# Patient Record
Sex: FEMALE | Race: WHITE | NOT HISPANIC OR LATINO | Employment: PART TIME
[De-identification: names, ages, dates, MRNs, and addresses within clinical notes are randomized per-mention and may not be internally consistent; named-entity substitution may affect disease eponyms.]

---

## 2017-11-13 ENCOUNTER — CHARTING TRANS (OUTPATIENT)
Dept: OTHER | Age: 52
End: 2017-11-13

## 2017-11-13 ASSESSMENT — PAIN SCALES - GENERAL: PAINLEVEL_OUTOF10: 0

## 2017-11-28 ENCOUNTER — LAB SERVICES (OUTPATIENT)
Dept: OTHER | Age: 52
End: 2017-11-28

## 2017-11-28 ENCOUNTER — IMAGING SERVICES (OUTPATIENT)
Dept: OTHER | Age: 52
End: 2017-11-28

## 2017-11-28 ENCOUNTER — HOSPITAL (OUTPATIENT)
Dept: OTHER | Age: 52
End: 2017-11-28
Attending: EMERGENCY MEDICINE

## 2017-11-28 LAB
ANALYZER ANC (IANC): NORMAL
ANALYZER ANC (IANC): NORMAL
ANION GAP SERPL CALC-SCNC: 16 MMOL/L (ref 10–20)
ANION GAP SERPL CALC-SCNC: 16 MMOL/L (ref 10–20)
APPEARANCE UR: CLEAR
APPEARANCE UR: CLEAR
BASO+EOS+MONOS # BLD: 0.2 K/MCL (ref 0.1–1.1)
BASO+EOS+MONOS # BLD: 0.2 THOUSAND/MCL (ref 0.1–1.1)
BASO+EOS+MONOS NFR BLD: 5 %
BASO+EOS+MONOS NFR BLD: 5 %
BILIRUB UR QL STRIP: NEGATIVE
BILIRUB UR QL STRIP: NEGATIVE
BUN SERPL-MCNC: 8 MG/DL (ref 6–20)
BUN SERPL-MCNC: 8 MG/DL (ref 6–20)
BUN/CREAT SERPL: 10 (ref 7–25)
BUN/CREAT SERPL: 10 (ref 7–25)
CHLORIDE SERPL-SCNC: 101 MMOL/L (ref 98–107)
CHLORIDE: 101 MMOL/L (ref 98–107)
CO2 SERPL-SCNC: 26 MMOL/L (ref 21–32)
CO2 SERPL-SCNC: 26 MMOL/L (ref 21–32)
COLOR UR: YELLOW
COLOR UR: YELLOW
CREAT SERPL-MCNC: 0.8 MG/DL (ref 0.51–0.95)
CREAT SERPL-MCNC: 0.8 MG/DL (ref 0.51–0.95)
DIFFERENTIAL METHOD BLD: NORMAL
DIFFERENTIAL METHOD BLD: NORMAL
ERYTHROCYTE [DISTWIDTH] IN BLOOD: 12.9 % (ref 11–15)
ERYTHROCYTE [DISTWIDTH] IN BLOOD: 12.9 % (ref 11–15)
GLUCOSE SERPL-MCNC: 106 MG/DL (ref 65–99)
GLUCOSE UR STRIP-MCNC: NEGATIVE MG/DL
GLUCOSE UR STRIP-MCNC: NEGATIVE MG/DL
HCG POINT OF CARE (5HGRST): NEGATIVE
HCG POINT OF CARE (5HGRST): NEGATIVE
HEMATOCRIT: 41.4 % (ref 36–46.5)
HEMATOCRIT: 41.4 % (ref 36–46.5)
HEMOCCULT STL QL: ABNORMAL
HEMOCCULT STL QL: ABNORMAL
HEMOGLOBIN: 14.3 G/DL (ref 12–15.5)
HGB BLD-MCNC: 14.3 GM/DL (ref 12–15.5)
KETONES UR STRIP-MCNC: NEGATIVE MG/DL
KETONES UR STRIP-MCNC: NEGATIVE MG/DL
LEUKOCYTE ESTERASE UR QL STRIP: NEGATIVE
LEUKOCYTE ESTERASE UR QL STRIP: NEGATIVE
LYMPHOCYTES # BLD: 2.2 K/MCL (ref 1–4.8)
LYMPHOCYTES # BLD: 2.2 THOUSAND/MCL (ref 1–4.8)
LYMPHOCYTES NFR BLD: 41 %
LYMPHOCYTES NFR BLD: 41 %
MCH RBC QN AUTO: 32.4 PG (ref 26–34)
MCHC RBC AUTO-ENTMCNC: 34.5 GM/DL (ref 32–36.5)
MCV RBC AUTO: 93.9 FL (ref 78–100)
MEAN CORPUSCULAR HEMOGLOBIN: 32.4 PG (ref 26–34)
MEAN CORPUSCULAR HGB CONC: 34.5 G/DL (ref 32–36.5)
MEAN CORPUSCULAR VOLUME: 93.9 FL (ref 78–100)
NEUTROPHILS # BLD: 2.9 K/MCL (ref 1.8–7.7)
NEUTROPHILS # BLD: 2.9 THOUSAND/MCL (ref 1.8–7.7)
NEUTROPHILS NFR BLD: 54 %
NEUTROPHILS NFR BLD: 54 %
NEUTS SEG NFR BLD: NORMAL
NEUTS SEG NFR BLD: NORMAL %
NITRITE UR QL STRIP: NEGATIVE
NITRITE UR QL STRIP: NEGATIVE
PH UR STRIP: 7 UNIT (ref 5–7)
PH UR STRIP: 7 UNITS (ref 5–7)
PLATELET # BLD: 199 THOUSAND/MCL (ref 140–450)
PLATELET COUNT: 199 K/MCL (ref 140–450)
POTASSIUM SERPL-SCNC: 3.9 MMOL/L (ref 3.4–5.1)
POTASSIUM SERPL-SCNC: 3.9 MMOL/L (ref 3.4–5.1)
PROT UR STRIP-MCNC: NEGATIVE MG/DL
PROT UR STRIP-MCNC: NEGATIVE MG/DL
RBC # BLD: 4.41 MILLION/MCL (ref 4–5.2)
RED CELL COUNT: 4.41 MIL/MCL (ref 4–5.2)
SODIUM SERPL-SCNC: 139 MMOL/L (ref 135–145)
SODIUM SERPL-SCNC: 139 MMOL/L (ref 135–145)
SP GR UR STRIP: 1.01 (ref 1–1.03)
SP GR UR STRIP: 1.01 (ref 1–1.03)
UROBILINOGEN UR STRIP-MCNC: 0.2 MG/DL (ref 0–1)
UROBILINOGEN UR STRIP-MCNC: 0.2 MG/DL (ref 0–1)
WBC # BLD: 5.3 THOUSAND/MCL (ref 4.2–11)
WHITE BLOOD COUNT: 5.3 K/MCL (ref 4.2–11)

## 2017-11-29 LAB — GLUCOSE SERPL-MCNC: 106 MG/DL (ref 65–99)

## 2018-01-08 ENCOUNTER — IMAGING SERVICES (OUTPATIENT)
Dept: OTHER | Age: 53
End: 2018-01-08

## 2018-01-08 ENCOUNTER — HOSPITAL (OUTPATIENT)
Dept: OTHER | Age: 53
End: 2018-01-08
Attending: FAMILY MEDICINE

## 2018-01-08 ENCOUNTER — LAB SERVICES (OUTPATIENT)
Dept: OTHER | Age: 53
End: 2018-01-08

## 2018-01-11 ENCOUNTER — CHARTING TRANS (OUTPATIENT)
Dept: OTHER | Age: 53
End: 2018-01-11

## 2018-01-11 LAB
25(OH)D3+25(OH)D2 SERPL-MCNC: 40.8 NG/ML (ref 30–100)
ALBUMIN SERPL-MCNC: 4.1 G/DL (ref 3.6–5.1)
ALBUMIN/GLOB SERPL: 1.6 (ref 1–2.4)
ALP SERPL-CCNC: 47 UNITS/L (ref 45–117)
ALT SERPL-CCNC: 19 UNITS/L
ANION GAP SERPL CALC-SCNC: 11 MMOL/L (ref 10–20)
AST SERPL-CCNC: 23 UNITS/L
BASOPHILS # BLD: 0 K/MCL (ref 0–0.3)
BASOPHILS NFR BLD: 0 %
BILIRUB SERPL-MCNC: 1 MG/DL (ref 0.2–1)
BUN SERPL-MCNC: 13 MG/DL (ref 6–20)
BUN/CREAT SERPL: 16 (ref 7–25)
CALCIUM SERPL-MCNC: 8.6 MG/DL (ref 8.4–10.2)
CHLORIDE SERPL-SCNC: 100 MMOL/L (ref 98–107)
CHOLEST SERPL-MCNC: 199 MG/DL
CHOLEST/HDLC SERPL: 3.1
CO2 SERPL-SCNC: 30 MMOL/L (ref 21–32)
CREAT SERPL-MCNC: 0.82 MG/DL (ref 0.51–0.95)
DIFFERENTIAL METHOD BLD: ABNORMAL
EOSINOPHIL # BLD: 0 K/MCL (ref 0.1–0.5)
EOSINOPHIL NFR BLD: 1 %
ERYTHROCYTE [DISTWIDTH] IN BLOOD: 12.5 % (ref 11–15)
GLOBULIN SER-MCNC: 2.6 G/DL (ref 2–4)
GLUCOSE SERPL-MCNC: 91 MG/DL (ref 65–99)
HDLC SERPL-MCNC: 65 MG/DL
HEMATOCRIT: 40.8 % (ref 36–46.5)
HEMOGLOBIN: 13.4 G/DL (ref 12–15.5)
LDLC SERPL CALC-MCNC: 122 MG/DL
LENGTH OF FAST TIME PATIENT: 12 HRS
LENGTH OF FAST TIME PATIENT: 12 HRS
LYMPHOCYTES # BLD: 1.6 K/MCL (ref 1–4.8)
LYMPHOCYTES NFR BLD: 31 %
MEAN CORPUSCULAR HEMOGLOBIN: 32 PG (ref 26–34)
MEAN CORPUSCULAR HGB CONC: 32.8 G/DL (ref 32–36.5)
MEAN CORPUSCULAR VOLUME: 97.4 FL (ref 78–100)
MONOCYTES # BLD: 0.3 K/MCL (ref 0.3–0.9)
MONOCYTES NFR BLD: 7 %
NEUTROPHILS # BLD: 3.1 K/MCL (ref 1.8–7.7)
NEUTROPHILS NFR BLD: 61 %
NONHDLC SERPL-MCNC: 134 MG/DL
PLATELET COUNT: 223 K/MCL (ref 140–450)
POTASSIUM SERPL-SCNC: 3.9 MMOL/L (ref 3.4–5.1)
RED CELL COUNT: 4.19 MIL/MCL (ref 4–5.2)
SODIUM SERPL-SCNC: 137 MMOL/L (ref 135–145)
TOTAL PROTEIN: 6.7 G/DL (ref 6.4–8.2)
TRIGL SERPL-MCNC: 59 MG/DL
TSH SERPL-ACNC: 2.65 MCUNITS/ML (ref 0.35–5)
WHITE BLOOD COUNT: 5.1 K/MCL (ref 4.2–11)

## 2018-01-16 ENCOUNTER — PRIOR ORIGINAL RECORDS (OUTPATIENT)
Dept: OTHER | Age: 53
End: 2018-01-16

## 2018-01-16 ENCOUNTER — MYAURORA ACCOUNT LINK (OUTPATIENT)
Dept: OTHER | Age: 53
End: 2018-01-16

## 2018-01-25 ENCOUNTER — MYAURORA ACCOUNT LINK (OUTPATIENT)
Dept: OTHER | Age: 53
End: 2018-01-25

## 2018-01-29 ENCOUNTER — PRIOR ORIGINAL RECORDS (OUTPATIENT)
Dept: OTHER | Age: 53
End: 2018-01-29

## 2018-01-29 ENCOUNTER — HOSPITAL (OUTPATIENT)
Dept: OTHER | Age: 53
End: 2018-01-29
Attending: INTERNAL MEDICINE

## 2018-01-29 LAB — COLONOSCOPY STUDY: NORMAL

## 2018-02-05 ENCOUNTER — PRIOR ORIGINAL RECORDS (OUTPATIENT)
Dept: OTHER | Age: 53
End: 2018-02-05

## 2018-02-05 ENCOUNTER — MYAURORA ACCOUNT LINK (OUTPATIENT)
Dept: OTHER | Age: 53
End: 2018-02-05

## 2018-06-12 ENCOUNTER — HOSPITAL (OUTPATIENT)
Dept: OTHER | Age: 53
End: 2018-06-12
Attending: OBSTETRICS & GYNECOLOGY

## 2018-11-02 VITALS
BODY MASS INDEX: 24.64 KG/M2 | RESPIRATION RATE: 16 BRPM | HEIGHT: 67 IN | DIASTOLIC BLOOD PRESSURE: 70 MMHG | OXYGEN SATURATION: 98 % | TEMPERATURE: 97.4 F | HEART RATE: 64 BPM | WEIGHT: 157 LBS | SYSTOLIC BLOOD PRESSURE: 118 MMHG

## 2018-11-26 ENCOUNTER — MYAURORA ACCOUNT LINK (OUTPATIENT)
Dept: OTHER | Age: 53
End: 2018-11-26

## 2018-11-26 ENCOUNTER — CHARTING TRANS (OUTPATIENT)
Dept: OTHER | Age: 53
End: 2018-11-26

## 2018-11-26 ASSESSMENT — PAIN SCALES - GENERAL: PAINLEVEL_OUTOF10: 0

## 2018-12-04 ENCOUNTER — TELEPHONE (OUTPATIENT)
Dept: FAMILY MEDICINE | Age: 53
End: 2018-12-04

## 2019-01-14 VITALS
BODY MASS INDEX: 24.88 KG/M2 | DIASTOLIC BLOOD PRESSURE: 70 MMHG | HEIGHT: 67 IN | SYSTOLIC BLOOD PRESSURE: 122 MMHG | OXYGEN SATURATION: 98 % | WEIGHT: 158.5 LBS | RESPIRATION RATE: 16 BRPM

## 2019-02-28 VITALS
HEIGHT: 67 IN | WEIGHT: 158 LBS | DIASTOLIC BLOOD PRESSURE: 76 MMHG | HEART RATE: 60 BPM | SYSTOLIC BLOOD PRESSURE: 122 MMHG | BODY MASS INDEX: 24.8 KG/M2

## 2019-02-28 VITALS
WEIGHT: 159.5 LBS | DIASTOLIC BLOOD PRESSURE: 73 MMHG | BODY MASS INDEX: 25.03 KG/M2 | RESPIRATION RATE: 16 BRPM | SYSTOLIC BLOOD PRESSURE: 127 MMHG | HEIGHT: 67 IN | HEART RATE: 42 BPM

## 2019-06-14 ENCOUNTER — HOSPITAL (OUTPATIENT)
Dept: OTHER | Age: 54
End: 2019-06-14
Attending: OBSTETRICS & GYNECOLOGY

## 2019-06-28 ENCOUNTER — HOSPITAL (OUTPATIENT)
Dept: OTHER | Age: 54
End: 2019-06-28
Attending: FAMILY MEDICINE

## 2019-06-28 LAB
25(OH)D3+25(OH)D2 SERPL-MCNC: 39.2 NG/ML (ref 30–100)
25(OH)D3+25(OH)D2 SERPL-MCNC: NORMAL NG/ML
ALBUMIN SERPL-MCNC: 3.6 G/DL (ref 3.6–5.1)
ALBUMIN/GLOB SERPL: 0.9 {RATIO} (ref 1–2.4)
ALP SERPL-CCNC: 94 UNITS/L (ref 45–117)
ALT SERPL-CCNC: 22 UNITS/L
ANALYZER ANC (IANC): NORMAL
ANION GAP SERPL CALC-SCNC: 9 MMOL/L (ref 10–20)
AST SERPL-CCNC: 12 UNITS/L
BILIRUB SERPL-MCNC: 0.6 MG/DL (ref 0.2–1)
BUN SERPL-MCNC: 13 MG/DL (ref 6–20)
BUN/CREAT SERPL: 19 (ref 7–25)
CALCIUM SERPL-MCNC: 8.6 MG/DL (ref 8.4–10.2)
CHLORIDE SERPL-SCNC: 107 MMOL/L (ref 98–107)
CHOLEST SERPL-MCNC: 139 MG/DL
CHOLEST SERPL-MCNC: NORMAL MG/DL
CHOLEST/HDLC SERPL: 2.3 {RATIO}
CO2 SERPL-SCNC: 28 MMOL/L (ref 21–32)
CREAT SERPL-MCNC: 0.67 MG/DL (ref 0.51–0.95)
ERYTHROCYTE [DISTWIDTH] IN BLOOD: 12.8 % (ref 11–15)
GLOBULIN SER-MCNC: 3.9 G/DL (ref 2–4)
GLUCOSE SERPL-MCNC: 165 MG/DL (ref 65–99)
HBA1C MFR BLD: 10.0           24 %
HBA1C MFR BLD: 6.0            126 %
HBA1C MFR BLD: 6.5            14 %
HBA1C MFR BLD: 6.7 % (ref 4.5–5.6)
HBA1C MFR BLD: 7.0            154 %
HBA1C MFR BLD: 7.5            169 %
HBA1C MFR BLD: 8.0            183 %
HBA1C MFR BLD: 8.5            197 %
HBA1C MFR BLD: 9.0            212 %
HBA1C MFR BLD: 9.5            226 %
HBA1C MFR BLD: ABNORMAL %
HCT VFR BLD CALC: 40.4 % (ref 36–46.5)
HDLC SERPL-MCNC: 60 MG/DL
HDLC SERPL-MCNC: NORMAL MG/DL
HGB BLD-MCNC: 13.1 G/DL (ref 12–15.5)
LDLC SERPL CALC-MCNC: 67 MG/DL
LDLC SERPL CALC-MCNC: NORMAL MG/DL
LENGTH OF FAST TIME PATIENT: ABNORMAL HRS
LENGTH OF FAST TIME PATIENT: NORMAL HRS
MCH RBC QN AUTO: 29.2 PG (ref 26–34)
MCHC RBC AUTO-ENTMCNC: 32.4 G/DL (ref 32–36.5)
MCV RBC AUTO: 90.2 FL (ref 78–100)
NONHDLC SERPL-MCNC: 79 MG/DL
NONHDLC SERPL-MCNC: NORMAL MG/DL
NRBC (NRBCRE): 0 /100 WBC
PLATELET # BLD: 246 K/MCL (ref 140–450)
POTASSIUM SERPL-SCNC: 3.9 MMOL/L (ref 3.4–5.1)
PROT SERPL-MCNC: 7.5 G/DL (ref 6.4–8.2)
RBC # BLD: 4.48 MIL/MCL (ref 4–5.2)
SODIUM SERPL-SCNC: 140 MMOL/L (ref 135–145)
TRIGL SERPL-MCNC: 62 MG/DL
TRIGL SERPL-MCNC: NORMAL MG/DL
TSH SERPL-ACNC: 1.03 MCUNITS/ML (ref 0.35–5)
WBC # BLD: 5.8 K/MCL (ref 4.2–11)

## 2019-06-29 LAB
CREAT UR-MCNC: 93.9 MG/DL
MICROALBUMIN UR-MCNC: <0.5 MG/DL
MICROALBUMIN/CREAT UR: NORMAL MG/G

## 2019-07-02 ENCOUNTER — TELEPHONE (OUTPATIENT)
Dept: SCHEDULING | Age: 54
End: 2019-07-02

## 2019-07-02 DIAGNOSIS — Z20.7 SCABIES EXPOSURE: Primary | ICD-10-CM

## 2019-07-02 RX ORDER — PERMETHRIN 50 MG/G
CREAM TOPICAL ONCE
Qty: 60 G | Refills: 0 | Status: SHIPPED | OUTPATIENT
Start: 2019-07-02 | End: 2019-07-02

## 2020-01-22 ENCOUNTER — TELEPHONE (OUTPATIENT)
Dept: SCHEDULING | Age: 55
End: 2020-01-22

## 2020-03-10 ENCOUNTER — TELEPHONE (OUTPATIENT)
Dept: SCHEDULING | Age: 55
End: 2020-03-10

## 2020-03-10 ENCOUNTER — OFFICE VISIT (OUTPATIENT)
Dept: FAMILY MEDICINE | Age: 55
End: 2020-03-10

## 2020-03-10 VITALS
HEART RATE: 57 BPM | TEMPERATURE: 98 F | WEIGHT: 159 LBS | BODY MASS INDEX: 24.1 KG/M2 | DIASTOLIC BLOOD PRESSURE: 70 MMHG | OXYGEN SATURATION: 98 % | SYSTOLIC BLOOD PRESSURE: 102 MMHG | HEIGHT: 68 IN | RESPIRATION RATE: 16 BRPM

## 2020-03-10 DIAGNOSIS — J03.90 TONSILLITIS: Primary | ICD-10-CM

## 2020-03-10 DIAGNOSIS — Z12.31 VISIT FOR SCREENING MAMMOGRAM: ICD-10-CM

## 2020-03-10 DIAGNOSIS — R92.30 DENSE BREAST TISSUE ON MAMMOGRAM: ICD-10-CM

## 2020-03-10 PROBLEM — R94.39 ABNORMAL STRESS TEST: Status: ACTIVE | Noted: 2018-01-12

## 2020-03-10 PROBLEM — R94.39 ABNORMAL STRESS TEST: Status: RESOLVED | Noted: 2018-01-12 | Resolved: 2020-03-10

## 2020-03-10 PROCEDURE — 99214 OFFICE O/P EST MOD 30 MIN: CPT | Performed by: PHYSICIAN ASSISTANT

## 2020-03-10 RX ORDER — AMOXICILLIN 875 MG/1
875 TABLET, COATED ORAL 2 TIMES DAILY
Qty: 20 TABLET | Refills: 0 | Status: SHIPPED | OUTPATIENT
Start: 2020-03-10 | End: 2020-11-23 | Stop reason: ALTCHOICE

## 2020-03-10 RX ORDER — RIZATRIPTAN BENZOATE 10 MG/1
10 TABLET ORAL PRN
COMMUNITY
Start: 2018-01-16 | End: 2020-09-04

## 2020-03-10 ASSESSMENT — ENCOUNTER SYMPTOMS
SORE THROAT: 1
COUGH: 0
FEVER: 0

## 2020-03-10 ASSESSMENT — PATIENT HEALTH QUESTIONNAIRE - PHQ9
2. FEELING DOWN, DEPRESSED OR HOPELESS: NOT AT ALL
1. LITTLE INTEREST OR PLEASURE IN DOING THINGS: NOT AT ALL
SUM OF ALL RESPONSES TO PHQ9 QUESTIONS 1 AND 2: 0
SUM OF ALL RESPONSES TO PHQ9 QUESTIONS 1 AND 2: 0

## 2020-04-06 ENCOUNTER — APPOINTMENT (OUTPATIENT)
Dept: MAMMOGRAPHY | Age: 55
End: 2020-04-06
Attending: PHYSICIAN ASSISTANT

## 2020-04-06 ENCOUNTER — APPOINTMENT (OUTPATIENT)
Dept: ULTRASOUND IMAGING | Age: 55
End: 2020-04-06
Attending: PHYSICIAN ASSISTANT

## 2020-04-14 ENCOUNTER — TELEPHONE (OUTPATIENT)
Dept: SCHEDULING | Age: 55
End: 2020-04-14

## 2020-04-14 DIAGNOSIS — R92.30 DENSE BREAST TISSUE ON MAMMOGRAM: Primary | ICD-10-CM

## 2020-04-14 DIAGNOSIS — Z12.31 VISIT FOR SCREENING MAMMOGRAM: ICD-10-CM

## 2020-05-20 DIAGNOSIS — Z12.39 SCREENING BREAST EXAMINATION: Primary | ICD-10-CM

## 2020-06-15 ENCOUNTER — APPOINTMENT (OUTPATIENT)
Dept: ULTRASOUND IMAGING | Age: 55
End: 2020-06-15
Attending: PHYSICIAN ASSISTANT

## 2020-06-15 ENCOUNTER — APPOINTMENT (OUTPATIENT)
Dept: MAMMOGRAPHY | Age: 55
End: 2020-06-15
Attending: PHYSICIAN ASSISTANT

## 2020-06-15 ENCOUNTER — HOSPITAL ENCOUNTER (OUTPATIENT)
Dept: MAMMOGRAPHY | Age: 55
Discharge: HOME | End: 2020-06-15
Attending: OBSTETRICS & GYNECOLOGY

## 2020-06-15 DIAGNOSIS — Z12.39 SCREENING BREAST EXAMINATION: ICD-10-CM

## 2020-06-15 PROCEDURE — 77063 BREAST TOMOSYNTHESIS BI: CPT

## 2020-09-04 RX ORDER — RIZATRIPTAN BENZOATE 10 MG/1
TABLET ORAL
Qty: 9 TABLET | Refills: 5 | Status: SHIPPED | OUTPATIENT
Start: 2020-09-04 | End: 2023-07-17 | Stop reason: SDUPTHER

## 2020-11-20 ENCOUNTER — TELEPHONE (OUTPATIENT)
Dept: FAMILY MEDICINE | Age: 55
End: 2020-11-20

## 2020-11-23 ENCOUNTER — OFFICE VISIT (OUTPATIENT)
Dept: FAMILY MEDICINE | Age: 55
End: 2020-11-23

## 2020-11-23 DIAGNOSIS — Z01.419 WELL FEMALE EXAM WITH ROUTINE GYNECOLOGICAL EXAM: Primary | ICD-10-CM

## 2020-11-23 DIAGNOSIS — Z13.220 SCREENING, LIPID: ICD-10-CM

## 2020-11-23 DIAGNOSIS — Z12.39 SCREENING BREAST EXAMINATION: ICD-10-CM

## 2020-11-23 DIAGNOSIS — Z11.51 SCREENING FOR HUMAN PAPILLOMAVIRUS (HPV): ICD-10-CM

## 2020-11-23 DIAGNOSIS — Z13.1 SCREENING FOR DIABETES MELLITUS: ICD-10-CM

## 2020-11-23 PROCEDURE — 99396 PREV VISIT EST AGE 40-64: CPT | Performed by: PHYSICIAN ASSISTANT

## 2020-11-23 ASSESSMENT — PAIN SCALES - GENERAL: PAINLEVEL: 0

## 2020-11-23 ASSESSMENT — ENCOUNTER SYMPTOMS
FEVER: 0
COUGH: 0
DIZZINESS: 0
ABDOMINAL PAIN: 0
SHORTNESS OF BREATH: 0

## 2020-11-30 LAB — HPV16+18+45 E6+E7MRNA CVX NAA+PROBE: NORMAL

## 2020-12-14 ENCOUNTER — HOSPITAL ENCOUNTER (OUTPATIENT)
Dept: ULTRASOUND IMAGING | Age: 55
Discharge: HOME | End: 2020-12-14
Attending: PHYSICIAN ASSISTANT

## 2020-12-14 ENCOUNTER — TELEPHONE (OUTPATIENT)
Dept: SCHEDULING | Age: 55
End: 2020-12-14

## 2020-12-14 ENCOUNTER — HOSPITAL ENCOUNTER (OUTPATIENT)
Dept: MAMMOGRAPHY | Age: 55
Discharge: HOME | End: 2020-12-14
Attending: PHYSICIAN ASSISTANT

## 2020-12-14 DIAGNOSIS — R92.30 DENSE BREASTS: ICD-10-CM

## 2020-12-14 DIAGNOSIS — R92.30 DENSE BREAST TISSUE ON MAMMOGRAM: Primary | ICD-10-CM

## 2020-12-14 DIAGNOSIS — Z12.31 ENCOUNTER FOR SCREENING MAMMOGRAM FOR MALIGNANT NEOPLASM OF BREAST: ICD-10-CM

## 2020-12-14 DIAGNOSIS — R92.2 DENSE BREASTS: ICD-10-CM

## 2020-12-14 PROCEDURE — 76641 ULTRASOUND BREAST COMPLETE: CPT

## 2020-12-14 PROCEDURE — 77063 BREAST TOMOSYNTHESIS BI: CPT

## 2020-12-17 ENCOUNTER — IMMUNIZATION (OUTPATIENT)
Dept: LAB | Facility: HOSPITAL | Age: 55
End: 2020-12-17
Attending: PREVENTIVE MEDICINE

## 2020-12-17 DIAGNOSIS — Z23 NEED FOR VACCINATION: ICD-10-CM

## 2020-12-17 PROCEDURE — 0001A PFIZER-BIONTECH COVID-19 VACCINE: CPT

## 2021-01-07 ENCOUNTER — IMMUNIZATION (OUTPATIENT)
Dept: LAB | Facility: HOSPITAL | Age: 56
End: 2021-01-07
Attending: PREVENTIVE MEDICINE

## 2021-01-07 DIAGNOSIS — Z23 NEED FOR VACCINATION: ICD-10-CM

## 2021-01-07 PROCEDURE — 0002A SARSCOV2 VAC 30MCG/0.3ML IM: CPT

## 2021-01-22 ENCOUNTER — TELEPHONE (OUTPATIENT)
Dept: FAMILY MEDICINE | Age: 56
End: 2021-01-22

## 2021-04-26 ENCOUNTER — TELEPHONE (OUTPATIENT)
Dept: PHYSICAL THERAPY | Facility: HOSPITAL | Age: 56
End: 2021-04-26

## 2021-04-26 ENCOUNTER — OFFICE VISIT (OUTPATIENT)
Dept: PHYSICAL THERAPY | Facility: HOSPITAL | Age: 56
End: 2021-04-26
Attending: EMERGENCY MEDICINE
Payer: OTHER MISCELLANEOUS

## 2021-04-26 ENCOUNTER — APPOINTMENT (OUTPATIENT)
Dept: OTHER | Facility: HOSPITAL | Age: 56
End: 2021-04-26
Attending: EMERGENCY MEDICINE

## 2021-04-26 ENCOUNTER — ORDER TRANSCRIPTION (OUTPATIENT)
Dept: PHYSICAL THERAPY | Facility: HOSPITAL | Age: 56
End: 2021-04-26

## 2021-04-26 DIAGNOSIS — M54.2 NECK PAIN: ICD-10-CM

## 2021-04-26 DIAGNOSIS — S46.912A STRAIN OF LEFT SHOULDER, INITIAL ENCOUNTER: ICD-10-CM

## 2021-04-26 DIAGNOSIS — R52 PAIN: Primary | ICD-10-CM

## 2021-04-26 DIAGNOSIS — S46.912A LEFT SHOULDER STRAIN: Primary | ICD-10-CM

## 2021-04-26 PROCEDURE — 97162 PT EVAL MOD COMPLEX 30 MIN: CPT

## 2021-04-26 PROCEDURE — 97530 THERAPEUTIC ACTIVITIES: CPT

## 2021-04-26 PROCEDURE — 97140 MANUAL THERAPY 1/> REGIONS: CPT

## 2021-04-26 NOTE — PROGRESS NOTES
SHOULDER EVALUATION:   Referring Physician: Dr. Lamonte Fink  Diagnosis: L shoulder pain (RTC strain) and L cervicalgia     Date of Service: 4/26/2021     PATIENT SUMMARY   Gail Arteaga is a 50year old female who presents to therapy today with complaints of L s shoulder pain and L cervicalgia.  The results of the objective tests and measures show full passive L shoulder ROM but painful arc and impaired active flexion/abduction, decreased strength L shoulder (especially flex/abd/ER),    Functional deficits include Sharath - +                       Randa - +                                          Esther - +                                  Cross over - +                                   Empty Can - +   ACJ                                      A-P  Shear x x expectations, posture, pain science 10' •  •  •  •  •    Manual Therapy • L DN infraspinatus with stim 5'  • L posterior delt with stim 5'  • L posterior GHJ Gr 3 5' •  •  •  •  •    Neuromuscular Re-education •  •  •  •  •  •    Subjective Comparable sign Potential:excellent    Patient/Family/Caregiver was advised of these findings, precautions, and treatment options and has agreed to actively participate in planning and for this course of care.     Thank you for your referral. Please co-sign or sign and ret

## 2021-04-28 ENCOUNTER — OFFICE VISIT (OUTPATIENT)
Dept: PHYSICAL THERAPY | Facility: HOSPITAL | Age: 56
End: 2021-04-28
Attending: EMERGENCY MEDICINE
Payer: OTHER MISCELLANEOUS

## 2021-04-28 PROCEDURE — 97140 MANUAL THERAPY 1/> REGIONS: CPT

## 2021-04-28 PROCEDURE — 97530 THERAPEUTIC ACTIVITIES: CPT

## 2021-04-28 PROCEDURE — 97112 NEUROMUSCULAR REEDUCATION: CPT

## 2021-04-28 NOTE — PROGRESS NOTES
PHYSICAL THERAPY DAILY TREATMENT NOTE  Sylvester Vidal  50year old  female  Diagnosis:  L shoulder pain (RTC strain) and L cervicalgia      Insurance (Authorized # of Visits):      N/A  Authorizing Physician: Dr. Cruz ref.  provider found  Next MD visit: none s 2 fingers  Extension: 60** local neck pain (L Upper C quadrant +)  Sidebending: R x; L x  Rotation: R 75; L 57* •  •  •  •  •    NV=next visit, AMRAP= as many reps as possible, RPE= rate of perceived exertion, LLLD= low load long duration, MIREILLE= repeated e

## 2021-05-05 ENCOUNTER — OFFICE VISIT (OUTPATIENT)
Dept: PHYSICAL THERAPY | Facility: HOSPITAL | Age: 56
End: 2021-05-05
Attending: EMERGENCY MEDICINE
Payer: OTHER MISCELLANEOUS

## 2021-05-05 PROCEDURE — 97140 MANUAL THERAPY 1/> REGIONS: CPT

## 2021-05-05 PROCEDURE — 97110 THERAPEUTIC EXERCISES: CPT

## 2021-05-05 NOTE — PROGRESS NOTES
PHYSICAL THERAPY DAILY TREATMENT NOTE  Mendy Sneed  50year old  female  Diagnosis:  L shoulder pain (RTC strain) and L cervicalgia      Insurance (Authorized # of Visits):      N/A  Authorizing Physician: Dr. Columba Baker  Next MD visit: none scheduled  Fall Ri gardening, belt is tough, bra, lifting, pulling, pushing, reaching, shoveling, pushing up •  •  •  •  •    Objective comparable signs • All impinge tests L +, +michelle/reloc  • Post cap decr - MT  • Strength decr MT, LT, ER, flex, abd  Cervical AROM Screen: Jamilah Marroquin improve shoulder ER strength throughout to 5/5 to improve function with lifting overhead   · Pt will demonstrate increased mid/low trap strength to 5/5 to promote improved shoulder mechanics and stabilization with lifting and reaching   Charges: 1TE, 3MT

## 2021-05-10 ENCOUNTER — APPOINTMENT (OUTPATIENT)
Dept: OTHER | Facility: HOSPITAL | Age: 56
End: 2021-05-10
Attending: EMERGENCY MEDICINE

## 2021-05-12 ENCOUNTER — OFFICE VISIT (OUTPATIENT)
Dept: PHYSICAL THERAPY | Facility: HOSPITAL | Age: 56
End: 2021-05-12
Attending: EMERGENCY MEDICINE
Payer: OTHER MISCELLANEOUS

## 2021-05-12 PROCEDURE — 97112 NEUROMUSCULAR REEDUCATION: CPT

## 2021-05-12 PROCEDURE — 97140 MANUAL THERAPY 1/> REGIONS: CPT

## 2021-05-12 NOTE — PROGRESS NOTES
PHYSICAL THERAPY DAILY TREATMENT NOTE  Arleen Esteban  50year old  female  Diagnosis:  L shoulder pain (RTC strain) and L cervicalgia      Insurance (Authorized # of Visits):      N/A  Authorizing Physician: Dr. Cruz ref.  provider found  Next MD visit: none s infraspinatus with stim 5'  • L ant delt with stim 5'  • L posterior GHJ Gr 3 8' • L DN infraspinatus with stim 5'  • L post delt with stim 5'  • L posterior GHJ Gr 3 8'  • Seated CTJ Gr5x1, supine upper thoracic Gr5x1  • R C2-3 Gr 3, Gr 5  • 40' • 1st rib needling, BFR= blood flow restriction, LOP=limb occlusion pression, rEMOM=rehab every minute on the minute     L Shoulder  • Pt describes symptom level current 2/10, at best 0/10, at worst 5/10.   o Aggravating factors:reaching behind back, reaching overhe

## 2021-05-17 ENCOUNTER — OFFICE VISIT (OUTPATIENT)
Dept: PHYSICAL THERAPY | Facility: HOSPITAL | Age: 56
End: 2021-05-17
Payer: OTHER MISCELLANEOUS

## 2021-05-17 PROCEDURE — 97140 MANUAL THERAPY 1/> REGIONS: CPT

## 2021-05-17 PROCEDURE — 97112 NEUROMUSCULAR REEDUCATION: CPT

## 2021-05-17 PROCEDURE — 97110 THERAPEUTIC EXERCISES: CPT

## 2021-05-17 NOTE — PROGRESS NOTES
PHYSICAL THERAPY DAILY TREATMENT NOTE  Ana Lilia Castillo  50year old  female  Diagnosis:  L shoulder pain (RTC strain) and L cervicalgia      Insurance (Authorized # of Visits):      N/A  Authorizing Physician: Dr. Cruz ref.  provider found  Next MD visit: none s Scapular   Flexion: R 5/5; L 3+/5*  5-/5 (2/10)  Abduction: R 5/5; L 3+/5* 5/5 painfree  ER: R 5/5; L 4/5* 5/5  IR: R 5/5; L 5/5  [email or web address]: R x/5; L x/5 5/5  [email or web address]: R x/5; L x/5 5/5 Rhomboids: R5/5, L 5/5  Mid trap: R 5/5; L 3/5 4/5  Lats: R 5/5, L 5/5  Low t delt with stim 5'  • L posterior GHJ Gr 3 8'  • Seated CTJ Gr5x1, supine upper thoracic Gr5x1  • R C2-3 Gr 3, Gr 5  • 40' • 1st rib L inferior mob Gr 3  • STJ mobs all directions, upward rotation/retraction  • Post cap stretch  • L posterior GHJ Gr 3 8'  • pain.  Improving scratch IR with MT to symmetrical, still relief with posterior glide support.   Improving CROM rotation with cervical mobilization •  •    NV=next visit, AMRAP= as many reps as possible, RPE= rate of perceived exertion, LLLD= low load long

## 2021-05-24 ENCOUNTER — OFFICE VISIT (OUTPATIENT)
Dept: OTHER | Facility: HOSPITAL | Age: 56
End: 2021-05-24
Attending: EMERGENCY MEDICINE

## 2021-05-24 DIAGNOSIS — R52 PAIN: Primary | ICD-10-CM

## 2021-05-26 VITALS
TEMPERATURE: 96.5 F | OXYGEN SATURATION: 99 % | RESPIRATION RATE: 16 BRPM | DIASTOLIC BLOOD PRESSURE: 64 MMHG | SYSTOLIC BLOOD PRESSURE: 110 MMHG | BODY MASS INDEX: 24.52 KG/M2 | HEIGHT: 67 IN | HEART RATE: 58 BPM | WEIGHT: 156.2 LBS

## 2021-06-02 ENCOUNTER — APPOINTMENT (OUTPATIENT)
Dept: PHYSICAL THERAPY | Facility: HOSPITAL | Age: 56
End: 2021-06-02
Payer: OTHER MISCELLANEOUS

## 2021-06-04 ENCOUNTER — OFFICE VISIT (OUTPATIENT)
Dept: PHYSICAL THERAPY | Facility: HOSPITAL | Age: 56
End: 2021-06-04
Payer: OTHER MISCELLANEOUS

## 2021-06-04 PROCEDURE — 97140 MANUAL THERAPY 1/> REGIONS: CPT

## 2021-06-04 PROCEDURE — 97110 THERAPEUTIC EXERCISES: CPT

## 2021-06-04 NOTE — PROGRESS NOTES
ProgressSummary  Pt has attended 6 visits in Physical Therapy.       PHYSICAL THERAPY DAILY TREATMENT NOTE  Kenan Castillo  50year old  female  Diagnosis:  L shoulder pain (RTC strain) and L cervicalgia      Insurance (Authorized # of Visits):      N/A  Au wfl but painful and painful arc   Scratch IR/ER now symmetrical but painful end range     PROM: (* denotes performed with pain)  Shoulder    All wfl symmetrical     Strength/MMT: (* denotes performed with pain)  Shoulder Scapular   Flexion: R 5/5; L 3+/5* expectations, posture, pain science 5' •  •  •  •    Manual Therapy • L DN infraspinatus with stim 5'  • L posterior delt with stim 5'  • L posterior GHJ Gr 3 5' • L DN infraspinatus with stim 5'  • L ant delt with stim 5'  • L posterior GHJ Gr 3 8' • L DN deltoid 0/10 pain. Improving scratch IR with MT to symmetrical, still relief with posterior glide support. Improving CROM rotation with cervical mobilization   .   Working on L cervical rotation, 1st rib mobility, etc.  Flexion improving 4+/5 to 5-/5 with function with lifting overhead MET  · Pt will improve shoulder ER strength throughout to 5/5 to improve function with lifting overhead MET  · Pt will demonstrate increased mid/low trap strength to 5/5 to promote improved shoulder mechanics and stabilizatio

## 2021-06-15 ENCOUNTER — OFFICE VISIT (OUTPATIENT)
Dept: PHYSICAL THERAPY | Facility: HOSPITAL | Age: 56
End: 2021-06-15
Payer: OTHER MISCELLANEOUS

## 2021-06-15 PROCEDURE — 97110 THERAPEUTIC EXERCISES: CPT

## 2021-06-15 PROCEDURE — 97530 THERAPEUTIC ACTIVITIES: CPT

## 2021-06-15 PROCEDURE — 97140 MANUAL THERAPY 1/> REGIONS: CPT

## 2021-06-15 NOTE — PROGRESS NOTES
PHYSICAL THERAPY DAILY TREATMENT NOTE  Mendy Sneed  50year old  female  Diagnosis:  L shoulder pain (RTC strain) and L cervicalgia      Insurance (Authorized # of Visits):      N/A  Authorizing Physician: Dr. Columba Baker  Next MD visit: none scheduled  Fall Ri Gr 3  • STJ mobs all directions, upward rotation/retraction  • Post cap stretch  • L posterior GHJ Gr 3 8'  • C2, C3 upslope R Gr 5  • 30'  •  • L DN infraspinatus with stim 5'  • L posterior GHJ Gr 3 8'   • L DN infraspinatus x2 and L post delt x2 with st MT to symmetrical, still relief with posterior glide support. Improving CROM rotation with cervical mobilization   .   Working on L cervical rotation, 1st rib mobility, etc.  Flexion improving 4+/5 to 5-/5 with cervicothoracic manual. Scratch IR still tend lifting overhead MET  · Pt will improve shoulder ER strength throughout to 5/5 to improve function with lifting overhead MET  · Pt will demonstrate increased mid/low trap strength to 5/5 to promote improved shoulder mechanics and stabilization with lifting

## 2021-06-16 ENCOUNTER — APPOINTMENT (OUTPATIENT)
Dept: PHYSICAL THERAPY | Facility: HOSPITAL | Age: 56
End: 2021-06-16
Payer: OTHER MISCELLANEOUS

## 2021-07-06 ENCOUNTER — APPOINTMENT (OUTPATIENT)
Dept: PHYSICAL THERAPY | Facility: HOSPITAL | Age: 56
End: 2021-07-06
Attending: EMERGENCY MEDICINE
Payer: OTHER MISCELLANEOUS

## 2021-07-06 ENCOUNTER — TELEPHONE (OUTPATIENT)
Dept: PHYSICAL THERAPY | Facility: HOSPITAL | Age: 56
End: 2021-07-06

## 2021-07-28 ENCOUNTER — OFFICE VISIT (OUTPATIENT)
Dept: PHYSICAL THERAPY | Facility: HOSPITAL | Age: 56
End: 2021-07-28
Attending: EMERGENCY MEDICINE
Payer: OTHER MISCELLANEOUS

## 2021-07-28 PROCEDURE — 97140 MANUAL THERAPY 1/> REGIONS: CPT

## 2021-07-28 PROCEDURE — 97032 APPL MODALITY 1+ESTIM EA 15: CPT

## 2021-07-28 PROCEDURE — 97112 NEUROMUSCULAR REEDUCATION: CPT

## 2021-07-28 NOTE — PROGRESS NOTES
PHYSICAL THERAPY DAILY TREATMENT NOTE  Oscar Fernandez  50year old  female  Diagnosis:  L shoulder pain (RTC strain) and L cervicalgia      Insurance (Authorized # of Visits):      N/A  Authorizing Physician: Dr. Papito Coyne  Next MD visit: none scheduled  Fall Ri Gr5x1, supine upper thoracic Gr5x1  • R C2-3 Gr 3, Gr 5  • 40' • 1st rib L inferior mob Gr 3  • STJ mobs all directions, upward rotation/retraction  • Post cap stretch  • L posterior GHJ Gr 3 8'  • C2, C3 upslope R Gr 5  • 30'  •  • L DN infraspinatus with trial and DN post deltoid 0/10 pain. Improving scratch IR with MT to symmetrical, still relief with posterior glide support. Improving CROM rotation with cervical mobilization   .   Working on L cervical rotation, 1st rib mobility, etc.  Flexion improving abduction strength throughout to 5/5 to improve function with lifting overhead MET  · Pt will improve shoulder ER strength throughout to 5/5 to improve function with lifting overhead MET  · Pt will demonstrate increased mid/low trap strength to 5/5 to prom

## 2021-08-13 ENCOUNTER — APPOINTMENT (OUTPATIENT)
Dept: PHYSICAL THERAPY | Facility: HOSPITAL | Age: 56
End: 2021-08-13
Attending: EMERGENCY MEDICINE
Payer: OTHER MISCELLANEOUS

## 2021-08-17 ENCOUNTER — OFFICE VISIT (OUTPATIENT)
Dept: PHYSICAL THERAPY | Facility: HOSPITAL | Age: 56
End: 2021-08-17
Attending: EMERGENCY MEDICINE
Payer: OTHER MISCELLANEOUS

## 2021-08-17 PROCEDURE — 97140 MANUAL THERAPY 1/> REGIONS: CPT

## 2021-08-17 PROCEDURE — 97110 THERAPEUTIC EXERCISES: CPT

## 2021-08-17 NOTE — PROGRESS NOTES
PHYSICAL THERAPY DAILY TREATMENT NOTE  Tharon Gilford  50year old  female  Diagnosis:  L shoulder pain (RTC strain) and L cervicalgia      Insurance (Authorized # of Visits):      N/A  Authorizing Physician: Dr. Donna Talavera  Next MD visit: none scheduled  Fall Ri stretch  • L posterior GHJ Gr 3 8'  • C2, C3 upslope R Gr 5  • 30'  •  • L DN infraspinatus with stim 5'  • L posterior GHJ Gr 3 8'   • L DN infraspinatus x2 and L post delt x2 with stim 5'  • L DN biceps x1 and L lat delt x1 with stim 5'  • GHJ inf/post G post deltoid 0/10 pain. Improving scratch IR with MT to symmetrical, still relief with posterior glide support. Improving CROM rotation with cervical mobilization   .   Working on L cervical rotation, 1st rib mobility, etc.  Flexion improving 4+/5 to 5-/5 strength throughout to 5/5 to improve function with lifting overhead MET  · Pt will improve shoulder ER strength throughout to 5/5 to improve function with lifting overhead MET  · Pt will demonstrate increased mid/low trap strength to 5/5 to promote improv

## 2021-08-20 ENCOUNTER — OFFICE VISIT (OUTPATIENT)
Dept: PHYSICAL THERAPY | Facility: HOSPITAL | Age: 56
End: 2021-08-20
Attending: EMERGENCY MEDICINE
Payer: OTHER MISCELLANEOUS

## 2021-08-20 PROCEDURE — 97140 MANUAL THERAPY 1/> REGIONS: CPT

## 2021-08-20 PROCEDURE — 97110 THERAPEUTIC EXERCISES: CPT

## 2021-08-20 NOTE — PROGRESS NOTES
PHYSICAL THERAPY DAILY TREATMENT NOTE  Shelbie Goode  50year old  female  Diagnosis:  L shoulder pain (RTC strain) and L cervicalgia      Insurance (Authorized # of Visits):      N/A  Authorizing Physician: Dr. Lexy Santiago  Next MD visit: none scheduled  Fall Ri inf/post Gr 3-4 15'   Neuromuscular Re-education •  • Prone LT LUE 3x10 (3-5\") slow  •  • rhyth stab LT HEP 5' • Shoulder ext pulldown blktb 2x15  • Tabletop pushups x10 • Serratus plus flexion gtb L 2x10 OKC  • Serratus wall slide with ER bias ytb 2x10 B Scratch IR  but improved as well. Flexion 4/5 (3-4/10 pain) improving to 5/5 (1/10 pain) post DN ER trial and DN post deltoid 0/10 pain. Improving scratch IR with MT to symmetrical, still relief with posterior glide support.   Improving CROM r stabilization with lifting and reaching Met low trap, in progress LT    Charges: 1TE, 2MT   Total Timed Treatment: 40  min  Total Treatment Time: 30 min

## 2021-09-01 ENCOUNTER — OFFICE VISIT (OUTPATIENT)
Dept: PHYSICAL THERAPY | Facility: HOSPITAL | Age: 56
End: 2021-09-01
Attending: EMERGENCY MEDICINE
Payer: OTHER MISCELLANEOUS

## 2021-09-01 PROCEDURE — 97140 MANUAL THERAPY 1/> REGIONS: CPT

## 2021-09-01 PROCEDURE — 97110 THERAPEUTIC EXERCISES: CPT

## 2021-09-01 PROCEDURE — 97112 NEUROMUSCULAR REEDUCATION: CPT

## 2021-09-01 NOTE — PROGRESS NOTES
PHYSICAL THERAPY DAILY TREATMENT NOTE  [facility]  50year old  female  Diagnosis:  L shoulder pain (RTC strain) and L cervicalgia      Insurance (Authorized # of Visits):      N/A  Authorizing Physician: Dr. Pebbles Krueger  Next MD visit: none scheduled  Fall Ri Gr 3-4 • L GHJ post mobs, shoulder quadrant ext/IR Gr 3-4 30' • L GHJ post/inf mobs, shoulder quadrant ext/IR Gr 3-4 30' • L DN infraspinatus x2 and L post delt x2 with stim 5'  • L DN biceps x1 and L lat delt x1 with stim 5'  • GHJ inf/post Gr 3-4 15'   N deltoid 0/10 pain. Improving scratch IR with MT to symmetrical, still relief with posterior glide support. Improving CROM rotation with cervical mobilization   .   Working on L cervical rotation, 1st rib mobility, etc.  Flexion improving 4+/5 to 5-/5 with throughout to 5/5 to improve function with lifting overhead MET  · Pt will improve shoulder ER strength throughout to 5/5 to improve function with lifting overhead MET  · Pt will demonstrate increased mid/low trap strength to 5/5 to promote improved should

## 2021-09-21 ENCOUNTER — OFFICE VISIT (OUTPATIENT)
Dept: PHYSICAL THERAPY | Facility: HOSPITAL | Age: 56
End: 2021-09-21
Attending: EMERGENCY MEDICINE
Payer: OTHER MISCELLANEOUS

## 2021-09-21 PROCEDURE — 97110 THERAPEUTIC EXERCISES: CPT

## 2021-09-21 PROCEDURE — 97140 MANUAL THERAPY 1/> REGIONS: CPT

## 2021-09-21 NOTE — PROGRESS NOTES
PHYSICAL THERAPY DAILY TREATMENT NOTE  Domingo Wolfe  49year old  female  Diagnosis:  L shoulder pain (RTC strain) and L cervicalgia      Insurance (Authorized # of Visits):      N/A  Authorizing Physician: Dr. Shawnee Calvert  Next MD visit: none scheduled  Fall Ri • Prone LT LUE 3x10 (3-5\") slow  •  • rhyth stab LT HEP 5' • Shoulder ext pulldown blktb 2x15  • Tabletop pushups x10 • ER facil and IR facil rtb scaption AMRAP ea 2x rounds ea  • Pulldown/pull through IR rtb swim stroke  • Table top pushup demo x5-10 • S rotation, 1st rib mobility, etc.  Flexion improving 4+/5 to 5-/5 with cervicothoracic manual. Scratch IR  but improved as well. Flexion 4/5 (3-4/10 pain) improving to 5/5 (1/10 pain) post DN ER trial and DN post deltoid 0/10 pain.   Improving s demonstrate increased mid/low trap strength to 5/5 to promote improved shoulder mechanics and stabilization with lifting and reaching Met low trap, in progress LT    Charges: 1TE, 1MT Total Timed Treatment: 30  min  Total Treatment Time: 30 min

## 2021-10-01 ENCOUNTER — IMMUNIZATION (OUTPATIENT)
Dept: LAB | Facility: HOSPITAL | Age: 56
End: 2021-10-01
Attending: EMERGENCY MEDICINE
Payer: COMMERCIAL

## 2021-10-01 DIAGNOSIS — Z23 NEED FOR VACCINATION: Primary | ICD-10-CM

## 2021-10-01 PROCEDURE — 0004A SARSCOV2 VAC 30MCG/0.3ML IM: CPT

## 2021-10-01 PROCEDURE — 0003A SARSCOV2 VAC 30MCG/0.3ML IM: CPT

## 2021-10-13 ENCOUNTER — LAB ENCOUNTER (OUTPATIENT)
Dept: LAB | Facility: HOSPITAL | Age: 56
End: 2021-10-13
Attending: PREVENTIVE MEDICINE
Payer: COMMERCIAL

## 2021-10-13 ENCOUNTER — TELEPHONE (OUTPATIENT)
Dept: INTERNAL MEDICINE CLINIC | Facility: HOSPITAL | Age: 56
End: 2021-10-13

## 2021-10-13 DIAGNOSIS — Z20.822 SUSPECTED COVID-19 VIRUS INFECTION: ICD-10-CM

## 2021-10-13 DIAGNOSIS — Z20.822 SUSPECTED COVID-19 VIRUS INFECTION: Primary | ICD-10-CM

## 2021-10-13 NOTE — TELEPHONE ENCOUNTER
Results and RTW guidelines:    COVID RESULT:    [x] Viewed by employee in [facility]. RTW plan and instructions as indicated on triage call. Manager notified. Estimated RTW date:   [] Discussed with employee   [] Unable to reach by phone.   Sent via [employer] completed, bring a copy of result to RTW appointment      Notes:     RTW PLAN:    [] RTW 10 days with clearance from [address] [place] [address]- call for appt 1-2 days prior to RTW date OR when feeling well enough to RTW (see guidelines above)  [x] RTW immediately, continue to mo

## 2021-10-13 NOTE — TELEPHONE ENCOUNTER
Department: Rehab                                 [] [facility]  []JAUN   [x] [address] [facility]    Dept Manager/Supervisor/team or clinical lead: Margi Henderson    Position:  [] MD     [] RN     [] Respiratory Therapist     [] PCT     [] PSR      [x] Other PT    HAVE YOU RECEIVED work? 10/14/2021    Did you have close contact with someone on your unit while not wearing a mask? (e.g., during meal breaks):  Yes [x]   No []    If yes, who:  Sometimes eating less than 6 feet  Do you share a workspace?  Yes [x]   No []       If yes, with [facility] 3-5 days after exposure.                                                  COVID-19 testing ordered: [x] Rapid    [] [facility]    Date test is to be taken:    10/13/2021    []  Outside testing       [x] Manager notified    INSTRUCTIONS PROVIDED:    [x]

## 2021-10-18 ENCOUNTER — TELEPHONE (OUTPATIENT)
Dept: INTERNAL MEDICINE CLINIC | Facility: HOSPITAL | Age: 56
End: 2021-10-18

## 2021-10-18 ENCOUNTER — TELEPHONE (OUTPATIENT)
Dept: SCHEDULING | Age: 56
End: 2021-10-18

## 2021-10-18 ENCOUNTER — HOSPITAL ENCOUNTER (OUTPATIENT)
Dept: MAMMOGRAPHY | Age: 56
Discharge: HOME | End: 2021-10-18
Attending: OBSTETRICS & GYNECOLOGY

## 2021-10-18 ENCOUNTER — LAB ENCOUNTER (OUTPATIENT)
Dept: LAB | Facility: HOSPITAL | Age: 56
End: 2021-10-18
Attending: PREVENTIVE MEDICINE

## 2021-10-18 DIAGNOSIS — Z20.822 SUSPECTED COVID-19 VIRUS INFECTION: Primary | ICD-10-CM

## 2021-10-18 DIAGNOSIS — Z20.822 SUSPECTED COVID-19 VIRUS INFECTION: ICD-10-CM

## 2021-10-18 DIAGNOSIS — Z12.31 VISIT FOR SCREENING MAMMOGRAM: ICD-10-CM

## 2021-10-18 PROCEDURE — 77063 BREAST TOMOSYNTHESIS BI: CPT

## 2021-10-18 NOTE — TELEPHONE ENCOUNTER
Department:         [] [facility]  []JAUN   [x] [facility]    Dept Manager/Supervisor/team or clinical lead: Jessica Escalante    Position:  [] MD     [] RN     [] Respiratory Therapist     [] PCT     [] PSR      [x] PT    HAVE YOU RECEIVED THE COVID-19 Vaccine?  Yes [x]    No someone on your unit while not wearing a mask? (e.g., during meal breaks):  Yes []   No [x]    If yes, who:   Do you share a workspace? Yes [x]   No []       If yes, with whom? Masking  Do you have any family members sick at home?      [x] Yes    [] No   If next 14 days. Test w/ [facility] 3-5 days after exposure.                                                  COVID-19 testing ordered: [x] Rapid    [] [facility]    Date test is to be taken:    10/18/2021    []  Outside testing       [x] Manager notified    INSTRU

## 2021-10-20 ENCOUNTER — OFFICE VISIT (OUTPATIENT)
Dept: FAMILY MEDICINE | Age: 56
End: 2021-10-20

## 2021-10-20 VITALS
OXYGEN SATURATION: 98 % | WEIGHT: 150 LBS | SYSTOLIC BLOOD PRESSURE: 120 MMHG | RESPIRATION RATE: 16 BRPM | HEIGHT: 66 IN | DIASTOLIC BLOOD PRESSURE: 80 MMHG | BODY MASS INDEX: 24.11 KG/M2 | HEART RATE: 54 BPM | TEMPERATURE: 97.8 F

## 2021-10-20 DIAGNOSIS — B97.89 VIRAL RESPIRATORY INFECTION: ICD-10-CM

## 2021-10-20 DIAGNOSIS — J98.8 VIRAL RESPIRATORY INFECTION: ICD-10-CM

## 2021-10-20 DIAGNOSIS — J40 BRONCHITIS: Primary | ICD-10-CM

## 2021-10-20 PROCEDURE — U0003 INFECTIOUS AGENT DETECTION BY NUCLEIC ACID (DNA OR RNA); SEVERE ACUTE RESPIRATORY SYNDROME CORONAVIRUS 2 (SARS-COV-2) (CORONAVIRUS DISEASE [COVID-19]), AMPLIFIED PROBE TECHNIQUE, MAKING USE OF HIGH THROUGHPUT TECHNOLOGIES AS DESCRIBED BY CMS-2020-01-R: HCPCS | Performed by: FAMILY MEDICINE

## 2021-10-20 PROCEDURE — 99214 OFFICE O/P EST MOD 30 MIN: CPT | Performed by: FAMILY MEDICINE

## 2021-10-20 PROCEDURE — U0005 INFEC AGEN DETEC AMPLI PROBE: HCPCS | Performed by: FAMILY MEDICINE

## 2021-10-20 RX ORDER — ALBUTEROL SULFATE 90 UG/1
2 AEROSOL, METERED RESPIRATORY (INHALATION) EVERY 6 HOURS PRN
Qty: 1 EACH | Refills: 0 | Status: SHIPPED | OUTPATIENT
Start: 2021-10-20 | End: 2022-02-14

## 2021-10-20 RX ORDER — AZITHROMYCIN 250 MG/1
TABLET, FILM COATED ORAL
Qty: 6 TABLET | Refills: 0 | Status: SHIPPED | OUTPATIENT
Start: 2021-10-20 | End: 2022-02-14

## 2021-10-20 ASSESSMENT — PATIENT HEALTH QUESTIONNAIRE - PHQ9
CLINICAL INTERPRETATION OF PHQ2 SCORE: NO FURTHER SCREENING NEEDED
SUM OF ALL RESPONSES TO PHQ9 QUESTIONS 1 AND 2: 0
1. LITTLE INTEREST OR PLEASURE IN DOING THINGS: NOT AT ALL
SUM OF ALL RESPONSES TO PHQ9 QUESTIONS 1 AND 2: 0
2. FEELING DOWN, DEPRESSED OR HOPELESS: NOT AT ALL
CLINICAL INTERPRETATION OF PHQ9 SCORE: NO FURTHER SCREENING NEEDED

## 2021-10-20 ASSESSMENT — PAIN SCALES - GENERAL: PAINLEVEL: 0

## 2021-10-21 LAB
SARS-COV-2 RNA RESP QL NAA+PROBE: NOT DETECTED
SERVICE CMNT-IMP: NORMAL
SERVICE CMNT-IMP: NORMAL

## 2021-11-30 ENCOUNTER — TELEPHONE (OUTPATIENT)
Dept: INTERNAL MEDICINE CLINIC | Facility: HOSPITAL | Age: 56
End: 2021-11-30

## 2021-11-30 ENCOUNTER — NURSE ONLY (OUTPATIENT)
Dept: LAB | Facility: HOSPITAL | Age: 56
End: 2021-11-30
Attending: PREVENTIVE MEDICINE

## 2021-11-30 DIAGNOSIS — Z20.822 EXPOSURE TO COVID-19 VIRUS: Primary | ICD-10-CM

## 2021-11-30 DIAGNOSIS — Z20.822 EXPOSURE TO COVID-19 VIRUS: ICD-10-CM

## 2021-11-30 NOTE — TELEPHONE ENCOUNTER
Department:  PT                             [] [address] [facility]  []JAUN   [x] [address] [facility]    Dept Manager/Supervisor/team or clinical lead: Rosita Kent    Position:  [] MD     [] RN     [] Respiratory Therapist     [] PCT     [] PSR      [x] Other PT    HAVE YOU RECEIVED THE CO shift you worked? 11/30/2021  When are you next scheduled to work? 12/01/2021    Did you have close contact with someone on your unit while not wearing a mask? (e.g., during meal breaks):  Yes []   No [x]    If yes, who:   Do you share a workspace?  Yes [] Test w/ [facility] 3-5 days after exposure.                                            COVID-19 testing ordered: [] Rapid    [x] [facility]    Date test is to be taken:    11/30-12/01    []  Outside testing       [x] Manager notified    INSTRUCTIONS PROVIDED:

## 2021-12-02 NOTE — TELEPHONE ENCOUNTER
Results and RTW guidelines:    COVID RESULT:    [x] Viewed by employee in [address] RTW plan and instructions as indicated on triage call. Manager notified. Estimated RTW date:   [] Discussed with employee   [] Unable to reach by phone.   Sent via [employer] bring a copy of result to RTW appointment      Notes:     RTW PLAN:    [] RTW 10 days with clearance from [address] [place] [address]- call for appt 1-2 days prior to RTW date OR when feeling well enough to RTW (see guidelines above)  [] RTW immediately, continue to monitor for sx

## 2021-12-04 ENCOUNTER — NURSE ONLY (OUTPATIENT)
Dept: LAB | Facility: HOSPITAL | Age: 56
End: 2021-12-04
Attending: PREVENTIVE MEDICINE
Payer: COMMERCIAL

## 2021-12-04 ENCOUNTER — TELEPHONE (OUTPATIENT)
Dept: INTERNAL MEDICINE CLINIC | Facility: HOSPITAL | Age: 56
End: 2021-12-04

## 2021-12-04 DIAGNOSIS — Z20.822 SUSPECTED COVID-19 VIRUS INFECTION: ICD-10-CM

## 2021-12-04 DIAGNOSIS — Z20.822 SUSPECTED COVID-19 VIRUS INFECTION: Primary | ICD-10-CM

## 2021-12-04 LAB — SARS-COV-2 RNA RESP QL NAA+PROBE: DETECTED

## 2021-12-04 NOTE — TELEPHONE ENCOUNTER
Department: PT at [facility]                               [] [address] [facility]  []JAUN   [x] [address] [facility]    Dept Manager/Supervisor/team or clinical lead: Rita Pascual    Position:  [] MD     [] RN     [] Respiratory Therapist     [] PCT     [] PSR      [x] Other PT    HAVE YOU RECEIVE work? Day  When was the last shift you worked?  12/03/2021  When are you next scheduled to work? 12/06/2021    Did you have close contact with someone on your unit while not wearing a mask? (e.g., during meal breaks):  Yes []   No [x]    If yes, who:   Do y monitor symptoms for the                                       next 14 days. Test w/ [facility] 3-5 days after exposure.                                                  COVID-19 testing ordered: [x] Rapid    [] [facility]    Date test is to be taken:   12/04/20

## 2021-12-05 ENCOUNTER — E-ADVICE (OUTPATIENT)
Dept: FAMILY MEDICINE | Age: 56
End: 2021-12-05

## 2021-12-05 DIAGNOSIS — U07.1 COVID-19 VIRUS INFECTION: Primary | ICD-10-CM

## 2021-12-05 NOTE — TELEPHONE ENCOUNTER
Results and RTW guidelines:    COVID RESULT:    [] Viewed by employee in [address] RTW plan and instructions as indicated on triage call. Manager notified. Estimated RTW date:   [x] Discussed with employee   [] Unable to reach by phone.   Sent via [employer] worsen                - If outside testing completed, bring a copy of result to RTW appointment      Notes: Ronna Simms was found to be positive on rapid. Discussed rtw guidelines and sent information vis my chart.      RTW PLAN:    [x] RTW 10 days with clearance

## 2021-12-06 RX ORDER — FLUCONAZOLE 150 MG/1
150 TABLET ORAL ONCE
Qty: 1 TABLET | Refills: 0 | Status: SHIPPED | OUTPATIENT
Start: 2021-12-06 | End: 2021-12-06

## 2022-02-01 ENCOUNTER — TELEPHONE (OUTPATIENT)
Dept: SCHEDULING | Age: 57
End: 2022-02-01

## 2022-02-14 ENCOUNTER — OFFICE VISIT (OUTPATIENT)
Dept: FAMILY MEDICINE | Age: 57
End: 2022-02-14

## 2022-02-14 VITALS
WEIGHT: 160 LBS | TEMPERATURE: 96.8 F | DIASTOLIC BLOOD PRESSURE: 78 MMHG | HEART RATE: 58 BPM | OXYGEN SATURATION: 98 % | HEIGHT: 66 IN | BODY MASS INDEX: 25.71 KG/M2 | RESPIRATION RATE: 16 BRPM | SYSTOLIC BLOOD PRESSURE: 122 MMHG

## 2022-02-14 DIAGNOSIS — Z13.1 SCREENING FOR DIABETES MELLITUS: ICD-10-CM

## 2022-02-14 DIAGNOSIS — R92.30 DENSE BREASTS: ICD-10-CM

## 2022-02-14 DIAGNOSIS — Z13.220 SCREENING, LIPID: ICD-10-CM

## 2022-02-14 DIAGNOSIS — R92.2 DENSE BREASTS: ICD-10-CM

## 2022-02-14 DIAGNOSIS — N95.1 HOT FLASH, MENOPAUSAL: ICD-10-CM

## 2022-02-14 DIAGNOSIS — Z00.00 HEALTH MAINTENANCE EXAMINATION: Primary | ICD-10-CM

## 2022-02-14 DIAGNOSIS — G43.009 MIGRAINE WITHOUT AURA AND WITHOUT STATUS MIGRAINOSUS, NOT INTRACTABLE: ICD-10-CM

## 2022-02-14 PROCEDURE — 99396 PREV VISIT EST AGE 40-64: CPT | Performed by: PHYSICIAN ASSISTANT

## 2022-02-14 RX ORDER — FLUCONAZOLE 150 MG/1
TABLET ORAL
COMMUNITY
Start: 2021-12-06 | End: 2022-02-14 | Stop reason: ALTCHOICE

## 2022-02-14 ASSESSMENT — PATIENT HEALTH QUESTIONNAIRE - PHQ9
2. FEELING DOWN, DEPRESSED OR HOPELESS: NOT AT ALL
1. LITTLE INTEREST OR PLEASURE IN DOING THINGS: NOT AT ALL
SUM OF ALL RESPONSES TO PHQ9 QUESTIONS 1 AND 2: 0
SUM OF ALL RESPONSES TO PHQ9 QUESTIONS 1 AND 2: 0
CLINICAL INTERPRETATION OF PHQ2 SCORE: NO FURTHER SCREENING NEEDED

## 2022-02-14 ASSESSMENT — ENCOUNTER SYMPTOMS
COUGH: 0
ABDOMINAL PAIN: 0
SHORTNESS OF BREATH: 0
DIZZINESS: 0
FEVER: 0

## 2022-02-14 ASSESSMENT — PAIN SCALES - GENERAL: PAINLEVEL: 0

## 2022-02-21 ENCOUNTER — HOSPITAL ENCOUNTER (OUTPATIENT)
Dept: MAMMOGRAPHY | Age: 57
Discharge: HOME | End: 2022-02-21
Attending: PHYSICIAN ASSISTANT

## 2022-02-21 DIAGNOSIS — Z12.31 ENCOUNTER FOR SCREENING MAMMOGRAM FOR MALIGNANT NEOPLASM OF BREAST: ICD-10-CM

## 2022-02-21 PROCEDURE — 77063 BREAST TOMOSYNTHESIS BI: CPT

## 2022-02-28 ENCOUNTER — HOSPITAL ENCOUNTER (OUTPATIENT)
Dept: ULTRASOUND IMAGING | Age: 57
Discharge: HOME | End: 2022-02-28
Attending: PHYSICIAN ASSISTANT

## 2022-02-28 DIAGNOSIS — R92.30 DENSE BREASTS: ICD-10-CM

## 2022-02-28 DIAGNOSIS — R92.2 DENSE BREASTS: ICD-10-CM

## 2022-02-28 PROCEDURE — 76641 ULTRASOUND BREAST COMPLETE: CPT

## 2022-06-04 ENCOUNTER — IMMUNIZATION (OUTPATIENT)
Dept: LAB | Age: 57
End: 2022-06-04
Attending: EMERGENCY MEDICINE
Payer: COMMERCIAL

## 2022-06-04 DIAGNOSIS — Z23 NEED FOR VACCINATION: Primary | ICD-10-CM

## 2022-06-04 PROCEDURE — 0054A SARSCOV2 VAC 30MCG TRS SUCR: CPT

## 2022-08-31 ENCOUNTER — TELEPHONE (OUTPATIENT)
Dept: INTERNAL MEDICINE CLINIC | Facility: HOSPITAL | Age: 57
End: 2022-08-31

## 2022-08-31 ENCOUNTER — LAB ENCOUNTER (OUTPATIENT)
Dept: LAB | Age: 57
End: 2022-08-31
Attending: PREVENTIVE MEDICINE
Payer: COMMERCIAL

## 2022-08-31 DIAGNOSIS — Z20.822 SUSPECTED COVID-19 VIRUS INFECTION: ICD-10-CM

## 2022-08-31 DIAGNOSIS — Z20.822 SUSPECTED COVID-19 VIRUS INFECTION: Primary | ICD-10-CM

## 2022-08-31 LAB — SARS-COV-2 RNA RESP QL NAA+PROBE: NOT DETECTED

## 2022-11-04 ENCOUNTER — IMMUNIZATION (OUTPATIENT)
Dept: LAB | Facility: HOSPITAL | Age: 57
End: 2022-11-04
Attending: PREVENTIVE MEDICINE
Payer: COMMERCIAL

## 2022-11-04 DIAGNOSIS — Z23 NEED FOR VACCINATION: Primary | ICD-10-CM

## 2022-11-04 PROCEDURE — 90471 IMMUNIZATION ADMIN: CPT

## 2022-11-04 PROCEDURE — 0124A SARSCOV2 VAC BVL 30MCG/0.3ML: CPT

## 2022-12-29 ENCOUNTER — HOSPITAL ENCOUNTER (OUTPATIENT)
Dept: MAMMOGRAPHY | Age: 57
Discharge: HOME | End: 2022-12-29
Attending: OBSTETRICS & GYNECOLOGY

## 2022-12-29 DIAGNOSIS — Z12.31 ENCOUNTER FOR SCREENING MAMMOGRAM FOR BREAST CANCER: ICD-10-CM

## 2022-12-29 PROCEDURE — 77063 BREAST TOMOSYNTHESIS BI: CPT

## 2023-07-14 ENCOUNTER — APPOINTMENT (OUTPATIENT)
Dept: PHYSICAL THERAPY | Facility: HOSPITAL | Age: 58
End: 2023-07-14
Attending: PHYSICIAN ASSISTANT
Payer: COMMERCIAL

## 2023-07-17 ENCOUNTER — ORDER TRANSCRIPTION (OUTPATIENT)
Dept: PHYSICAL THERAPY | Facility: HOSPITAL | Age: 58
End: 2023-07-17

## 2023-07-17 ENCOUNTER — OFFICE VISIT (OUTPATIENT)
Dept: FAMILY MEDICINE | Age: 58
End: 2023-07-17

## 2023-07-17 VITALS
SYSTOLIC BLOOD PRESSURE: 120 MMHG | WEIGHT: 158.2 LBS | RESPIRATION RATE: 12 BRPM | BODY MASS INDEX: 25.43 KG/M2 | HEART RATE: 67 BPM | OXYGEN SATURATION: 99 % | HEIGHT: 66 IN | TEMPERATURE: 97.5 F | DIASTOLIC BLOOD PRESSURE: 72 MMHG

## 2023-07-17 DIAGNOSIS — M77.8 SUBSCAPULARIS TENDINITIS OF LEFT SHOULDER: ICD-10-CM

## 2023-07-17 DIAGNOSIS — G43.009 MIGRAINE WITHOUT AURA AND WITHOUT STATUS MIGRAINOSUS, NOT INTRACTABLE: ICD-10-CM

## 2023-07-17 DIAGNOSIS — M25.512 LEFT SHOULDER PAIN: Primary | ICD-10-CM

## 2023-07-17 DIAGNOSIS — Z00.00 HEALTH MAINTENANCE EXAMINATION: Primary | ICD-10-CM

## 2023-07-17 DIAGNOSIS — Z12.31 VISIT FOR SCREENING MAMMOGRAM: ICD-10-CM

## 2023-07-17 DIAGNOSIS — R92.30 DENSE BREAST TISSUE ON MAMMOGRAM: ICD-10-CM

## 2023-07-17 PROCEDURE — 99396 PREV VISIT EST AGE 40-64: CPT | Performed by: PHYSICIAN ASSISTANT

## 2023-07-17 RX ORDER — RIZATRIPTAN BENZOATE 10 MG/1
5 TABLET ORAL PRN
Qty: 9 TABLET | Refills: 2 | Status: SHIPPED | OUTPATIENT
Start: 2023-07-17 | End: 2023-07-18 | Stop reason: SDUPTHER

## 2023-07-17 ASSESSMENT — ENCOUNTER SYMPTOMS
ABDOMINAL PAIN: 0
DIZZINESS: 0
COUGH: 0
FEVER: 0
SHORTNESS OF BREATH: 0

## 2023-07-17 ASSESSMENT — PATIENT HEALTH QUESTIONNAIRE - PHQ9
SUM OF ALL RESPONSES TO PHQ9 QUESTIONS 1 AND 2: 1
2. FEELING DOWN, DEPRESSED OR HOPELESS: SEVERAL DAYS
CLINICAL INTERPRETATION OF PHQ2 SCORE: NO FURTHER SCREENING NEEDED
SUM OF ALL RESPONSES TO PHQ9 QUESTIONS 1 AND 2: 1
1. LITTLE INTEREST OR PLEASURE IN DOING THINGS: NOT AT ALL

## 2023-07-17 ASSESSMENT — PAIN SCALES - GENERAL: PAINLEVEL: 0

## 2023-07-18 RX ORDER — RIZATRIPTAN BENZOATE 10 MG/1
10 TABLET ORAL PRN
Qty: 9 TABLET | Refills: 2 | Status: SHIPPED | OUTPATIENT
Start: 2023-07-18

## 2023-07-27 ENCOUNTER — TELEPHONE (OUTPATIENT)
Dept: PHYSICAL THERAPY | Facility: HOSPITAL | Age: 58
End: 2023-07-27

## 2023-07-31 ENCOUNTER — OFFICE VISIT (OUTPATIENT)
Dept: PHYSICAL THERAPY | Facility: HOSPITAL | Age: 58
End: 2023-07-31
Attending: PHYSICIAN ASSISTANT
Payer: COMMERCIAL

## 2023-07-31 DIAGNOSIS — M25.512 CHRONIC LEFT SHOULDER PAIN: Primary | ICD-10-CM

## 2023-07-31 DIAGNOSIS — G89.29 CHRONIC LEFT SHOULDER PAIN: Primary | ICD-10-CM

## 2023-07-31 PROCEDURE — 97140 MANUAL THERAPY 1/> REGIONS: CPT

## 2023-07-31 PROCEDURE — 97161 PT EVAL LOW COMPLEX 20 MIN: CPT

## 2023-08-14 ENCOUNTER — OFFICE VISIT (OUTPATIENT)
Dept: PHYSICAL THERAPY | Facility: HOSPITAL | Age: 58
End: 2023-08-14
Attending: PHYSICIAN ASSISTANT
Payer: COMMERCIAL

## 2023-08-14 PROCEDURE — 97140 MANUAL THERAPY 1/> REGIONS: CPT

## 2023-08-14 PROCEDURE — 97112 NEUROMUSCULAR REEDUCATION: CPT

## 2023-08-14 PROCEDURE — 97110 THERAPEUTIC EXERCISES: CPT

## 2023-08-14 NOTE — PROGRESS NOTES
Diagnosis:   Left shoulder pain (M25.512)        Referring Provider: Kerry Alberts  Date of Evaluation:    7/31/2023     Precautions:  None Next MD visit:   none scheduled  Date of Surgery: n/a   Insurance Primary/Secondary: [address] [place] [address] / N/A     # Auth Visits: no auth required            Subjective: Patient reports she feels her strength in her L shoulder is a little better. Has been able to progress the exercise at home to full plank position for one repetition. Objective:     Initial Evaluation   Subjective *  Crossing arms to take off shirt, sitting with arms crossed, reaching across her body while unloading the , putting on deodorant on R, reaching overhead with any weight, sleeping on L side, swimming, lifting     Objective *    Shoulder AROM    Flexion: R 175; L 160*    Abduction: R 175; L 160*    ER: R 110; L 80* (90 PROM)    IR: R 75; L 60* (70 PROM)    Shoulder MMT    Flexion: R 5/5; L 4-/5*    Abduction: R 5/5; L 4-/5*    ER: R 5/5; L 4/5*    IR: R 5/5; L 4+/5*    Scapular MMT    Rhomboids: R 4+/5, L 3-/5*    Mid trap: R 4+/5; L 3-/5*    Low trap: R 4+/5; L 4/5    TTP and active TrP L supraspinatus, infraspinatus    GH AP glide hypermobility on R, hypomobility on L       8/14/2023    Shoulder AROM (post-manual therapy)  Flexion: L 165  Abduction: L 168    Passive L shoulder horizontal adduction painful at just beyond neutral, with scapular block pain onset at neutral    Assessment: Increased pain-free shoulder elevation ROM following GH joint mobilization. Decreased eccentric control noted in the L shoulder/scapula during balance board taps, expect symptoms with cross-body reaching and other aggravating motions will improve once strength and motor control normalize.       Goals: (to be met in 10 visits)   Pt will report improved ability to sleep without waking due to shoulder pain   Pt will improve shoulder flexion AROM to >170 degrees to be able to reach into overhead cabinets without pain or restriction   Pt will improve shoulder abduction AROM to >170 degrees to improve ability to don deodorant, don/doff shirts, and wash hair   Pt will increase shoulder AROM ER to 90 deg to be able to reach and fasten seatbelt   Pt will increase shoulder AROM IR to 80 to be able to reach in back pocket, tuck in shirt, and turn steering wheel without pain  Pt will improve shoulder strength throughout to 5/5 to improve function with lifting objects overhead   Pt will demonstrate increased mid/low trap strength to 4+/5 to promote improved shoulder mechanics and stabilization with lifting and reaching   Pt will be independent and compliant with comprehensive HEP to maintain progress achieved in PT       Plan: Progress RC and scapular strength, CKC stability. Date: 8/14/2023  TX#: 2/10 Date:                 TX#: 3/ Date:                 TX#: 4/ Date:                 TX#: 5/ Date:    Tx#: 6/   Manual Therapy GH AP glide in IR Gr III L  L scapular PPM: compression, depression, posterior tilt       Therapeutic Exercise Prone low trap lift (hands behind head) 2 x 10  L shoulder ER at 90 deg flexion RTB 2 x 10  HEP update       Neuromuscular Re-education Balance board lateral taps 4 x 5       Therapeutic Activity        HEP: shoulder B ER iso in mod plank    Charges: MT 1, TE 1, NM 1       Total Timed Treatment: NM 8 min, MT 14 min, TE 18 min  Total Treatment Time: 40 min

## 2023-08-17 ENCOUNTER — OFFICE VISIT (OUTPATIENT)
Dept: PHYSICAL THERAPY | Facility: HOSPITAL | Age: 58
End: 2023-08-17
Attending: PHYSICIAN ASSISTANT
Payer: COMMERCIAL

## 2023-08-17 PROCEDURE — 97140 MANUAL THERAPY 1/> REGIONS: CPT

## 2023-08-17 PROCEDURE — 97110 THERAPEUTIC EXERCISES: CPT

## 2023-08-17 PROCEDURE — 97112 NEUROMUSCULAR REEDUCATION: CPT

## 2023-08-17 NOTE — PROGRESS NOTES
Diagnosis:   Left shoulder pain (M25.512)        Referring Provider: John Ortiz  Date of Evaluation:    7/31/2023     Precautions:  None Next MD visit:   none scheduled  Date of Surgery: n/a   Insurance Primary/Secondary: [address] [place] [address] / N/A     # Auth Visits: no auth required            Subjective: Patient reports soreness in her shoulder after last visit, mostly felt like she worked it, but she did have to take something because it was sore. Does not experience much soreness after doing the new exercise. Objective:     Initial Evaluation   Subjective *  Crossing arms to take off shirt, sitting with arms crossed, reaching across her body while unloading the , putting on deodorant on R, reaching overhead with any weight, sleeping on L side, swimming, lifting     Objective *    Shoulder AROM    Flexion: R 175; L 160*    Abduction: R 175; L 160*    ER: R 110; L 80* (90 PROM)    IR: R 75; L 60* (70 PROM)    Shoulder MMT    Flexion: R 5/5; L 4-/5*    Abduction: R 5/5; L 4-/5*    ER: R 5/5; L 4/5*    IR: R 5/5; L 4+/5*    Scapular MMT    Rhomboids: R 4+/5, L 3-/5*    Mid trap: R 4+/5; L 3-/5*    Low trap: R 4+/5; L 4/5    TTP and active TrP L supraspinatus, infraspinatus    GH AP glide hypermobility on R, hypomobility on L       8/14/2023    Shoulder AROM (start of session)  Flexion: L 160  Abduction: L 171      Assessment: Decreased through-range resistance noted during glenohumeral joint mobilization, and patient with increased pain-free flexion ROM immediately following. Continuing with closed chain stabilization training and scapular mm strengthening, modifying positioning to avoid 90/90 ER as this consistently provokes pain. Able to perform 90/90 ER with TB AP GH glide without symptoms, added to HEP.       Goals: (to be met in 10 visits)   Pt will report improved ability to sleep without waking due to shoulder pain   Pt will improve shoulder flexion AROM to >170 degrees to be able to reach into overhead cabinets without pain or restriction   Pt will improve shoulder abduction AROM to >170 degrees to improve ability to don deodorant, don/doff shirts, and wash hair   Pt will increase shoulder AROM ER to 90 deg to be able to reach and fasten seatbelt   Pt will increase shoulder AROM IR to 80 to be able to reach in back pocket, tuck in shirt, and turn steering wheel without pain  Pt will improve shoulder strength throughout to 5/5 to improve function with lifting objects overhead   Pt will demonstrate increased mid/low trap strength to 4+/5 to promote improved shoulder mechanics and stabilization with lifting and reaching   Pt will be independent and compliant with comprehensive HEP to maintain progress achieved in PT       Plan: Progress RC and scapular strength, CKC stability. Date: 8/14/2023  TX#: 2/10 Date: 8/17/2023           TX#: 3/10 Date:                 TX#: 4/ Date:                 TX#: 5/ Date:    Tx#: 6/   Manual Therapy GH AP glide in IR Gr III L  L scapular PPM: compression, depression, posterior tilt GH AP glide at neutral Gr III+ L // 168 deg shoulder flexion      Therapeutic Exercise Prone low trap lift (hands behind head) 2 x 10  L shoulder ER at 90 deg flexion RTB 2 x 10  HEP update Prone low trap lift (hands behind head) 2 x 10  MWM: seated L shoulder 90/90 ER with AP glide (black TB) 2 x 15  TB bilat horizontal abduction red 2 x 10      Neuromuscular Re-education Balance board lateral taps 4 x 5 Plyoball circles at 90 deg flexion 2 x 20 ea CW/CCW 2#      Therapeutic Activity        HEP: shoulder B ER iso in mod plank    Charges: MT 1, TE 1, NM 1       Total Timed Treatment: NM 8 min, MT 10 min, TE 21 min  Total Treatment Time: 39 min

## 2023-08-24 ENCOUNTER — OFFICE VISIT (OUTPATIENT)
Dept: PHYSICAL THERAPY | Facility: HOSPITAL | Age: 58
End: 2023-08-24
Attending: PHYSICIAN ASSISTANT
Payer: COMMERCIAL

## 2023-08-24 PROCEDURE — 97110 THERAPEUTIC EXERCISES: CPT

## 2023-08-24 PROCEDURE — 97140 MANUAL THERAPY 1/> REGIONS: CPT

## 2023-08-24 PROCEDURE — 97112 NEUROMUSCULAR REEDUCATION: CPT

## 2023-08-24 NOTE — PROGRESS NOTES
Diagnosis:   Left shoulder pain (M25.512)        Referring Provider: Stefano Mcnamara  Date of Evaluation:    7/31/2023     Precautions:  None Next MD visit:   none scheduled  Date of Surgery: n/a   Insurance Primary/Secondary: [address] [place] [address] / N/A     # Auth Visits: no auth required            Subjective: Patient reports her shoulder was feeling better in terms of pain - movements that she wasn't able to do previously she was able to do - and she tried doing a few additional strengthening exercises at the gym. Was sore afterward and especially the next day. Still feeling pain with forward reaching today though not as bad as yesterday. Objective:     Initial Evaluation   Subjective *  Crossing arms to take off shirt, sitting with arms crossed, reaching across her body while unloading the , putting on deodorant on R, reaching overhead with any weight, sleeping on L side, swimming, lifting     Objective *    Shoulder AROM    Flexion: R 175; L 160*    Abduction: R 175; L 160*    ER: R 110; L 80* (90 PROM)    IR: R 75; L 60* (70 PROM)    Shoulder MMT    Flexion: R 5/5; L 4-/5*    Abduction: R 5/5; L 4-/5*    ER: R 5/5; L 4/5*    IR: R 5/5; L 4+/5*    Scapular MMT    Rhomboids: R 4+/5, L 3-/5*    Mid trap: R 4+/5; L 3-/5*    Low trap: R 4+/5; L 4/5    TTP and active TrP L supraspinatus, infraspinatus    GH AP glide hypermobility on R, hypomobility on L       8/14/2023    Shoulder AROM (start of session)  Flexion: L 160  Abduction: L 171      Assessment: Progressed to open chain stabilization training, did note appropriate challenge at 90 deg flexion during rhythmic stabilization consistent with painful arc through this range today. Tolerance to scapular retraction is improving.       Goals: (to be met in 10 visits)   Pt will report improved ability to sleep without waking due to shoulder pain   Pt will improve shoulder flexion AROM to >170 degrees to be able to reach into overhead cabinets without pain or restriction   Pt will improve shoulder abduction AROM to >170 degrees to improve ability to don deodorant, don/doff shirts, and wash hair   Pt will increase shoulder AROM ER to 90 deg to be able to reach and fasten seatbelt   Pt will increase shoulder AROM IR to 80 to be able to reach in back pocket, tuck in shirt, and turn steering wheel without pain  Pt will improve shoulder strength throughout to 5/5 to improve function with lifting objects overhead   Pt will demonstrate increased mid/low trap strength to 4+/5 to promote improved shoulder mechanics and stabilization with lifting and reaching   Pt will be independent and compliant with comprehensive HEP to maintain progress achieved in PT       Plan: Progress RC and scapular strength, CKC stability. Date: 8/14/2023  TX#: 2/10 Date: 8/17/2023           TX#: 3/10 Date:  8/24/2023        TX#: 4/10 Date:                 TX#: 5/ Date:    Tx#: 6/   Manual Therapy GH AP glide in IR Gr III L  L scapular PPM: compression, depression, posterior tilt GH AP glide at neutral Gr III+ L // 168 deg shoulder flexion STM/TrP release L supraspinatus, infraspinatus     Therapeutic Exercise Prone low trap lift (hands behind head) 2 x 10  L shoulder ER at 90 deg flexion RTB 2 x 10  HEP update Prone low trap lift (hands behind head) 2 x 10  MWM: seated L shoulder 90/90 ER with AP glide (black TB) 2 x 15  TB bilat horizontal abduction red 2 x 10 Manually resisted L shoulder ER at ~60 deg abduction 3 x 10  Prone low trap lift (hands behind head) x10, with T x10  Edu: HEP, return to exercise       Neuromuscular Re-education Balance board lateral taps 4 x 5 Plyoball circles at 90 deg flexion 2 x 20 ea CW/CCW 2# Rhythmic stabilization at 90 deg flexion x2 to fatigue     Therapeutic Activity        HEP: shoulder B ER iso in mod plank    Charges: MT 1, TE 1, NM 1       Total Timed Treatment: NM 10 min, MT 15 min, TE 15 min  Total Treatment Time: 40 min

## 2023-08-31 ENCOUNTER — OFFICE VISIT (OUTPATIENT)
Dept: PHYSICAL THERAPY | Facility: HOSPITAL | Age: 58
End: 2023-08-31
Attending: PHYSICIAN ASSISTANT
Payer: COMMERCIAL

## 2023-08-31 PROCEDURE — 97112 NEUROMUSCULAR REEDUCATION: CPT

## 2023-08-31 PROCEDURE — 97140 MANUAL THERAPY 1/> REGIONS: CPT

## 2023-08-31 PROCEDURE — 97110 THERAPEUTIC EXERCISES: CPT

## 2023-09-07 ENCOUNTER — OFFICE VISIT (OUTPATIENT)
Dept: PHYSICAL THERAPY | Facility: HOSPITAL | Age: 58
End: 2023-09-07
Attending: PHYSICIAN ASSISTANT
Payer: COMMERCIAL

## 2023-09-07 PROCEDURE — 97140 MANUAL THERAPY 1/> REGIONS: CPT

## 2023-09-07 PROCEDURE — 97110 THERAPEUTIC EXERCISES: CPT

## 2023-09-07 PROCEDURE — 97112 NEUROMUSCULAR REEDUCATION: CPT

## 2023-09-07 NOTE — PROGRESS NOTES
Diagnosis:   Left shoulder pain (M25.512)        Referring Provider: Esha Owusu  Date of Evaluation:    7/31/2023     Precautions:  None Next MD visit:   none scheduled  Date of Surgery: n/a   Insurance Primary/Secondary: [address] [place] [address] / N/A     # Auth Visits: no auth required            Subjective: Patient reports she continues to notice improvements in strength. Certain motions are not as painful as they used to be, but movements reaching across the body are still consistently painful. Objective:     Initial Evaluation   Subjective *  Crossing arms to take off shirt, sitting with arms crossed, reaching across her body while unloading the , putting on deodorant on R, reaching overhead with any weight, sleeping on L side, swimming, lifting     Objective *    Shoulder AROM    Flexion: R 175; L 160*    Abduction: R 175; L 160*    ER: R 110; L 80* (90 PROM)    IR: R 75; L 60* (70 PROM)    Shoulder MMT    Flexion: R 5/5; L 4-/5*    Abduction: R 5/5; L 4-/5*    ER: R 5/5; L 4/5*    IR: R 5/5; L 4+/5*    Scapular MMT    Rhomboids: R 4+/5, L 3-/5*    Mid trap: R 4+/5; L 3-/5*    Low trap: R 4+/5; L 4/5    TTP and active TrP L supraspinatus, infraspinatus    GH AP glide hypermobility on R, hypomobility on L       8/14/2023    Shoulder AROM (start of session)  Flexion: L 160  Abduction: L 171      Assessment: Worked into provocative positions with both manual interventions and exercise today, particularly those involving horizontal adduction and internal rotation. Appropriate symptom response throughout. Steady improvement in motor control with repetition during stability exercises.       Goals: (to be met in 10 visits)   Pt will report improved ability to sleep without waking due to shoulder pain   Pt will improve shoulder flexion AROM to >170 degrees to be able to reach into overhead cabinets without pain or restriction   Pt will improve shoulder abduction AROM to >170 degrees to improve ability to don deodorant, don/doff shirts, and wash hair   Pt will increase shoulder AROM ER to 90 deg to be able to reach and fasten seatbelt   Pt will increase shoulder AROM IR to 80 to be able to reach in back pocket, tuck in shirt, and turn steering wheel without pain  Pt will improve shoulder strength throughout to 5/5 to improve function with lifting objects overhead   Pt will demonstrate increased mid/low trap strength to 4+/5 to promote improved shoulder mechanics and stabilization with lifting and reaching   Pt will be independent and compliant with comprehensive HEP to maintain progress achieved in PT       Plan: Progress RC and scapular strength, O/CKC stability.    Date: 8/14/2023  TX#: 2/10 Date: 8/17/2023           TX#: 3/10 Date:  8/24/2023        TX#: 4/10 Date: 8/31/2023           TX#: 5/10 Date: 9/7/2023   Tx#: 6/10   Manual Therapy GH AP glide in IR Gr III L  L scapular PPM: compression, depression, posterior tilt GH AP glide at neutral Gr III+ L // 168 deg shoulder flexion STM/TrP release L supraspinatus, infraspinatus GH AP glide in abduction/IR Gr III+ L GH distraction in cross-body reach Gr III   Therapeutic Exercise Prone low trap lift (hands behind head) 2 x 10  L shoulder ER at 90 deg flexion RTB 2 x 10  HEP update Prone low trap lift (hands behind head) 2 x 10  MWM: seated L shoulder 90/90 ER with AP glide (black TB) 2 x 15  TB bilat horizontal abduction red 2 x 10 Manually resisted L shoulder ER at ~60 deg abduction 3 x 10  Prone low trap lift (hands behind head) x10, with T x10  Edu: HEP, return to exercise   Supine 90/90 ER/IR 2# x2 to fatigue  Side plank 15s L (trial for HEP) Supine D2 flexion with RTB x8, with no resistance x8  HEP update   Neuromuscular Re-education Balance board lateral taps 4 x 5 Plyoball circles at 90 deg flexion 2 x 20 ea CW/CCW 2# Rhythmic stabilization at 90 deg flexion x2 to fatigue Rhythmic stabilization at 90 deg flexion x2 to fatigue Side-lying ER plyotoss 2# 30s x 3  Body blade at 90 deg scaption 20s x 3     Therapeutic Activity        HEP: shoulder B ER iso in mod plank    Charges: MT 1, TE 1, NM 1       Total Timed Treatment: NM 12 min, MT 12 min, TE 14 min  Total Treatment Time: 38 min

## 2023-09-14 ENCOUNTER — OFFICE VISIT (OUTPATIENT)
Dept: PHYSICAL THERAPY | Facility: HOSPITAL | Age: 58
End: 2023-09-14
Attending: PHYSICIAN ASSISTANT
Payer: COMMERCIAL

## 2023-09-14 PROCEDURE — 97140 MANUAL THERAPY 1/> REGIONS: CPT

## 2023-09-14 PROCEDURE — 97110 THERAPEUTIC EXERCISES: CPT

## 2023-09-14 PROCEDURE — 97112 NEUROMUSCULAR REEDUCATION: CPT

## 2023-09-16 ENCOUNTER — E-ADVICE (OUTPATIENT)
Dept: FAMILY MEDICINE | Age: 58
End: 2023-09-16

## 2023-09-21 ENCOUNTER — APPOINTMENT (OUTPATIENT)
Dept: PHYSICAL THERAPY | Facility: HOSPITAL | Age: 58
End: 2023-09-21
Attending: PHYSICIAN ASSISTANT
Payer: COMMERCIAL

## 2023-10-09 ENCOUNTER — OFFICE VISIT (OUTPATIENT)
Dept: PHYSICAL THERAPY | Facility: HOSPITAL | Age: 58
End: 2023-10-09
Attending: FAMILY MEDICINE
Payer: COMMERCIAL

## 2023-10-09 PROCEDURE — 97140 MANUAL THERAPY 1/> REGIONS: CPT

## 2023-10-09 PROCEDURE — 97110 THERAPEUTIC EXERCISES: CPT

## 2023-10-09 PROCEDURE — 97112 NEUROMUSCULAR REEDUCATION: CPT

## 2023-10-09 NOTE — PROGRESS NOTES
Diagnosis:   Left shoulder pain (M25.512)        Referring Provider: No ref. provider found  Date of Evaluation:    7/31/2023     Precautions:  None Next MD visit:   none scheduled  Date of Surgery: n/a   Insurance Primary/Secondary: [address] [place] [address] / N/A     # Auth Visits: no auth required            Subjective: Patient reports she was able to do her exercises several times while out of town, can feel some tightness when she reaches all the way overhead and a little soreness there too. Some movements that used to be painful don't seem to be bothering her as much. Objective:     Initial Evaluation   Subjective *  Crossing arms to take off shirt, sitting with arms crossed, reaching across her body while unloading the , putting on deodorant on R, reaching overhead with any weight, sleeping on L side, swimming, lifting     Objective *    Shoulder AROM    Flexion: R 175; L 160*    Abduction: R 175; L 160*    ER: R 110; L 80* (90 PROM)    IR: R 75; L 60* (70 PROM)    Shoulder MMT    Flexion: R 5/5; L 4-/5*    Abduction: R 5/5; L 4-/5*    ER: R 5/5; L 4/5*    IR: R 5/5; L 4+/5*    Scapular MMT    Rhomboids: R 4+/5, L 3-/5*    Mid trap: R 4+/5; L 3-/5*    Low trap: R 4+/5; L 4/5    TTP and active TrP L supraspinatus, infraspinatus    GH AP glide hypermobility on R, hypomobility on L       8/14/2023    Shoulder AROM (start of session)  Flexion: L 160  Abduction: L 171      Assessment: Note improving control with body blade in the scapular plane though still more challenging to maintain for full 30 second duration. Gradually progressing both open and closed chain stability training at and above shoulder height, symptom irritability in this range has significantly decreased.       Goals: (to be met in 10 visits)   Pt will report improved ability to sleep without waking due to shoulder pain   Pt will improve shoulder flexion AROM to >170 degrees to be able to reach into overhead cabinets without pain or restriction   Pt will improve shoulder abduction AROM to >170 degrees to improve ability to don deodorant, don/doff shirts, and wash hair   Pt will increase shoulder AROM ER to 90 deg to be able to reach and fasten seatbelt   Pt will increase shoulder AROM IR to 80 to be able to reach in back pocket, tuck in shirt, and turn steering wheel without pain  Pt will improve shoulder strength throughout to 5/5 to improve function with lifting objects overhead   Pt will demonstrate increased mid/low trap strength to 4+/5 to promote improved shoulder mechanics and stabilization with lifting and reaching   Pt will be independent and compliant with comprehensive HEP to maintain progress achieved in PT       Plan: Progress RC and scapular strength, O/CKC stability.    Date: 8/17/2023           TX#: 3/10 Date:  8/24/2023        TX#: 4/10 Date: 8/31/2023           TX#: 5/10 Date: 9/7/2023   Tx#: 6/10 Date: 9/14/2023   Tx#: 7/10 Date: 10/9/2023   Tx#: 8/10   Manual Therapy GH AP glide at neutral Gr III+ L // 168 deg shoulder flexion STM/TrP release L supraspinatus, infraspinatus GH AP glide in abduction/IR Gr III+ L GH distraction in cross-body reach Gr III GH AP glide in abduction/IR Gr III+ L GH AP glide in abduction/IR Gr III+ L  L shoulder quadrant Gr III+   Therapeutic Exercise Prone low trap lift (hands behind head) 2 x 10  MWM: seated L shoulder 90/90 ER with AP glide (black TB) 2 x 15  TB bilat horizontal abduction red 2 x 10 Manually resisted L shoulder ER at ~60 deg abduction 3 x 10  Prone low trap lift (hands behind head) x10, with T x10  Edu: HEP, return to exercise   Supine 90/90 ER/IR 2# x2 to fatigue  Side plank 15s L (trial for HEP) Supine D2 flexion with RTB x8, with no resistance x8  HEP update HEP review Single arm throwing arc with slow eccentric x15 RTB >  x15 GTB L  Handstand pushup (hands on mat table) 2 x 10   Neuromuscular Re-education Plyoball circles at 90 deg flexion 2 x 20 ea CW/CCW 2# Rhythmic stabilization at 90 deg flexion x2 to fatigue Rhythmic stabilization at 90 deg flexion x2 to fatigue Side-lying ER plyotoss 2# 30s x 3  Body blade at 90 deg scaption 20s x 3   Prone plank with alt hip extension 2 x 10  Plyoball toss through abduction arc at wall x8 2#  Plyoball toss 90/90 ER 30s x 2 2#  Body blade at 90 deg scaption 30s x 4 Body blade, varying directions at 90 deg scaption, 90 deg flexion and overhead, 30s bouts  Rhythmic stabilization at varying abduction/ER at wall w/4# plyoball x3 to fatibue     Therapeutic Activity         HEP: shoulder B ER iso in mod plank    Charges: MT 1, TE 1, NM 1       Total Timed Treatment: NM 14 min, MT 12 min, TE 14 min  Total Treatment Time: 40 min

## 2023-10-16 ENCOUNTER — OFFICE VISIT (OUTPATIENT)
Dept: FAMILY MEDICINE | Age: 58
End: 2023-10-16

## 2023-10-16 ENCOUNTER — TELEPHONE (OUTPATIENT)
Dept: INTERNAL MEDICINE CLINIC | Facility: HOSPITAL | Age: 58
End: 2023-10-16

## 2023-10-16 VITALS — HEART RATE: 68 BPM | BODY MASS INDEX: 24.33 KG/M2 | WEIGHT: 155 LBS | TEMPERATURE: 98 F | HEIGHT: 67 IN

## 2023-10-16 DIAGNOSIS — R05.1 ACUTE COUGH: Primary | ICD-10-CM

## 2023-10-16 DIAGNOSIS — U07.1 COVID-19 VIRUS INFECTION: ICD-10-CM

## 2023-10-16 PROCEDURE — 99213 OFFICE O/P EST LOW 20 MIN: CPT | Performed by: PHYSICIAN ASSISTANT

## 2023-10-16 RX ORDER — ALBUTEROL SULFATE 90 UG/1
2 AEROSOL, METERED RESPIRATORY (INHALATION) EVERY 4 HOURS PRN
Qty: 1 EACH | Refills: 0 | Status: SHIPPED | OUTPATIENT
Start: 2023-10-16

## 2023-10-16 RX ORDER — BENZONATATE 200 MG/1
200 CAPSULE ORAL 3 TIMES DAILY PRN
Qty: 21 CAPSULE | Refills: 0 | Status: SHIPPED | OUTPATIENT
Start: 2023-10-16 | End: 2023-10-23

## 2023-10-16 ASSESSMENT — ENCOUNTER SYMPTOMS
WHEEZING: 0
SHORTNESS OF BREATH: 0
COUGH: 1
CHILLS: 1
FEVER: 0
HEADACHES: 1

## 2023-10-16 ASSESSMENT — PAIN SCALES - GENERAL: PAINLEVEL: 5

## 2023-10-16 ASSESSMENT — PATIENT HEALTH QUESTIONNAIRE - PHQ9
SUM OF ALL RESPONSES TO PHQ9 QUESTIONS 1 AND 2: 0
CLINICAL INTERPRETATION OF PHQ2 SCORE: NO FURTHER SCREENING NEEDED
SUM OF ALL RESPONSES TO PHQ9 QUESTIONS 1 AND 2: 0
1. LITTLE INTEREST OR PLEASURE IN DOING THINGS: NOT AT ALL
2. FEELING DOWN, DEPRESSED OR HOPELESS: NOT AT ALL

## 2023-10-16 NOTE — TELEPHONE ENCOUNTER
Outside Covid Testing done    Results and RTW guidelines:    COVID RESULT reported:      Test type:    [] Rapid outside         [] PCR outside       [x] Home Test    Date of test: 10/16     Test location: home         [] Result viewed in Epic with verbal consent received from employee     [x] Results per Employee Covid Dashboard    [] Employee instructed to email copy of result to [email or web address]       [] Discussed with employee     [x] Unable to reach by phone. Sent via [employer]      [x] Positive    - Employee should quarantine at home for at least 5 days (day 1 is day after sx onset) , follow the CDC guidelines for cleaning and                              quarantining; see CDC.gov   -This employee may RTW on day 6 if asymptomatic or mildly symptomatic (with improving symptoms). Call Employee Health on day 5 if unable to return on                      day 6 after symptom onset.    -This employee needs to call Employee Health on day 5 after symptom onset. The employee needs to be cleared by Employee Health. - If Employee is still experiencing severe symptoms must make a RTW appt with Employee Health, Employee will not be cleared if:    1. Has consistent cough, shortness of breath or fatigue that restricts your physical activities    2. Is still feeling \"unwell\"    3. Within 15 days of hospitalization for COVID    4. Within 20 days of intubation for COVID    5. Still has a fever, vomiting or diarrhea   - Keep communication open with management about RTW and if symptoms worsen    - Monitor sx and temperature    - Employee should quarantine at home for at least 5 days from sx onset, follow the CDC guidelines for cleaning and quarantining; see CDC. gov                  - Notify PCP of result                 - Seek emergent care with worsening symptoms        Notes:     RTW PLAN:    [x]  If COVID positive results, off work minimum of 5 days from positive test or onset of symptoms (day 0)        On day 5, if asymptomatic or mildly symptomatic (with improving symptoms) may return to work day 6          On day 5, if symptomatic, call Employee Health for RTW screening        []  COVID positive result - call Employee Health on day 5 after symptom onset. The employee needs to be cleared by Employee Health to RTW. [] RTW immediately, continue to monitor for sx  [] RTW when sx improve; must be fever free for 24 hours w/o medications, Diarrhea/Vomiting free for 24 hours w/o medications  [] [facility] ordered; continue to monitor sx and call for new/worsening sx. Discuss RTW guidelines with manager                                                                                                                                                                        [] Rapid ordered to confirm home negative. [] May continue to work  [] Follow up with PCP  [] Home until further instruction from hotline with [facility] results  INSTRUCTIONS PROVIDED:  [x]  Plan as noted above  []  Length of time to obtain results  []  May return to work if views negative in My Chart and  remains fever, vomiting, and diarrhea free  []  May continue to work if remains asymptomatic   [x]  Quarantine instructions  [x]  Masking protocol  [x]  S/S of worsening infection/condition and importance of prompt medical re-evaluation including when to seek emergency care  [] If symptoms develop, stay home and call hotline for rapid test order    Estimated RTW date:  10/20  [x] Manager notified        [] [id number]  [facility]   [x] [facility]  Manager : Aubrey COVID-19 Vaccine?  Yes [x]    No []          If yes, date(s) received:    12/17/20; 1/7/21; 10/1/21        Which vaccine:  Pfizer [x]     Tracey Decker []    J&J []      SYMPTOMS (reported via dashboard):  [] asymptomatic  [x] symptomatic  [] GI symptoms only    Symptom onset date: 10/14    Fever   > 100F             Yes []      Cough                          Yes [x]      Shortness of breath  Yes []      Congestion                 Yes [x]      Runny nose                Yes [x]        Loss of Smell              Yes [x]        Loss of Taste             Yes []       Sore throat                 Yes []       Fatigue                        Yes [x]       Body Aches                Yes [x]        Chills                           Yes [x]        Headache                   Yes [x]             GI symptoms             Yes []     No [x]                     Nausea   []          Vomiting            []                                    Diarrhea  []          Upset stomach []      Employee has positive COVID Exposure? Yes []     No [x]    Date of exposure:     []  Coworker                       [] patient                        [] Family/friend    Employee has a history of Covid?   Yes [x]     No []   If Yes, when: 12/5/21    When was the last shift you worked?: 10/13    Notes:

## 2023-10-17 ENCOUNTER — APPOINTMENT (OUTPATIENT)
Dept: PHYSICAL THERAPY | Facility: HOSPITAL | Age: 58
End: 2023-10-17
Attending: FAMILY MEDICINE
Payer: COMMERCIAL

## 2023-10-23 NOTE — PROGRESS NOTES
Diagnosis:   Left shoulder pain (M25.512)        Referring Provider: No ref. provider found  Date of Evaluation:    7/31/2023     Precautions:  None Next MD visit:   none scheduled  Date of Surgery: n/a   Insurance Primary/Secondary: [address] [place] [address] / N/A     # Auth Visits: no auth required            Subjective: Patient reports she was ill over the past week, did very little with exercise and movement in general. Has not been feeling a lot of shoulder pain but also hasn't been very active. Objective:     Initial Evaluation   Subjective *  Crossing arms to take off shirt, sitting with arms crossed, reaching across her body while unloading the , putting on deodorant on R, reaching overhead with any weight, sleeping on L side, swimming, lifting     Objective *    Shoulder AROM    Flexion: R 175; L 160*    Abduction: R 175; L 160*    ER: R 110; L 80* (90 PROM)    IR: R 75; L 60* (70 PROM)    Shoulder MMT    Flexion: R 5/5; L 4-/5*    Abduction: R 5/5; L 4-/5*    ER: R 5/5; L 4/5*    IR: R 5/5; L 4+/5*    Scapular MMT    Rhomboids: R 4+/5, L 3-/5*    Mid trap: R 4+/5; L 3-/5*    Low trap: R 4+/5; L 4/5    TTP and active TrP L supraspinatus, infraspinatus    GH AP glide hypermobility on R, hypomobility on L       8/14/2023    Shoulder AROM (start of session)  Flexion: L 160  Abduction: L 171      Assessment: Motor control during body blade was improved compared to last visit at both shoulder height and overhead. Progressed to uneven load for stability training in these positions, appropriate challenge for patient.       Goals: (to be met in 10 visits)   Pt will report improved ability to sleep without waking due to shoulder pain   Pt will improve shoulder flexion AROM to >170 degrees to be able to reach into overhead cabinets without pain or restriction   Pt will improve shoulder abduction AROM to >170 degrees to improve ability to don deodorant, don/doff shirts, and wash hair   Pt will increase shoulder AROM ER to 90 deg to be able to reach and fasten seatbelt   Pt will increase shoulder AROM IR to 80 to be able to reach in back pocket, tuck in shirt, and turn steering wheel without pain  Pt will improve shoulder strength throughout to 5/5 to improve function with lifting objects overhead   Pt will demonstrate increased mid/low trap strength to 4+/5 to promote improved shoulder mechanics and stabilization with lifting and reaching   Pt will be independent and compliant with comprehensive HEP to maintain progress achieved in PT       Plan: Reassessment next visit.    Date: 8/17/2023           TX#: 3/10 Date:  8/24/2023        TX#: 4/10 Date: 8/31/2023           TX#: 5/10 Date: 9/7/2023   Tx#: 6/10 Date: 9/14/2023   Tx#: 7/10 Date: 10/9/2023   Tx#: 8/10 Date: 10/24/2023   Tx#: 9/10   Manual Therapy GH AP glide at neutral Gr III+ L // 168 deg shoulder flexion STM/TrP release L supraspinatus, infraspinatus GH AP glide in abduction/IR Gr III+ L GH distraction in cross-body reach Gr III GH AP glide in abduction/IR Gr III+ L GH AP glide in abduction/IR Gr III+ L  L shoulder quadrant Gr III+ GH AP glide in abduction/IR Gr III+ L   Therapeutic Exercise Prone low trap lift (hands behind head) 2 x 10  MWM: seated L shoulder 90/90 ER with AP glide (black TB) 2 x 15  TB bilat horizontal abduction red 2 x 10 Manually resisted L shoulder ER at ~60 deg abduction 3 x 10  Prone low trap lift (hands behind head) x10, with T x10  Edu: HEP, return to exercise   Supine 90/90 ER/IR 2# x2 to fatigue  Side plank 15s L (trial for HEP) Supine D2 flexion with RTB x8, with no resistance x8  HEP update HEP review Single arm throwing arc with slow eccentric x15 RTB >  x15 GTB L  Handstand pushup (hands on mat table) 2 x 10 Supine 90/90 ER/IR 4# x30  Single arm throwing arc with slow eccentric 2 x 15 GTB L   Neuromuscular Re-education Plyoball circles at 90 deg flexion 2 x 20 ea CW/CCW 2# Rhythmic stabilization at 90 deg flexion x2 to fatigue Rhythmic stabilization at 90 deg flexion x2 to fatigue Side-lying ER plyotoss 2# 30s x 3  Body blade at 90 deg scaption 20s x 3   Prone plank with alt hip extension 2 x 10  Plyoball toss through abduction arc at wall x8 2#  Plyoball toss 90/90 ER 30s x 2 2#  Body blade at 90 deg scaption 30s x 4 Body blade, varying directions at 90 deg scaption, 90 deg flexion and overhead, 30s bouts  Rhythmic stabilization at varying abduction/ER at wall w/4# plyoball x3 to fatibue   Supine KB rotation at 90 deg flexion 2 x 15 10# L  Bottoms up KB OH press 10# x2 to fatigue L  Body blade, varying directions at 90 deg scaption, 90 deg flexion and overhead, 30s bouts   Therapeutic Activity          HEP: shoulder B ER iso in mod plank    Charges: MT 1, TE 1, NM 1       Total Timed Treatment: NM 18 min, MT 10 min, TE 12 min  Total Treatment Time: 40 min

## 2023-10-24 ENCOUNTER — OFFICE VISIT (OUTPATIENT)
Dept: PHYSICAL THERAPY | Facility: HOSPITAL | Age: 58
End: 2023-10-24
Attending: FAMILY MEDICINE
Payer: COMMERCIAL

## 2023-10-24 PROCEDURE — 97112 NEUROMUSCULAR REEDUCATION: CPT

## 2023-10-24 PROCEDURE — 97110 THERAPEUTIC EXERCISES: CPT

## 2023-10-24 PROCEDURE — 97140 MANUAL THERAPY 1/> REGIONS: CPT

## 2023-10-31 ENCOUNTER — OFFICE VISIT (OUTPATIENT)
Dept: PHYSICAL THERAPY | Facility: HOSPITAL | Age: 58
End: 2023-10-31
Attending: FAMILY MEDICINE
Payer: COMMERCIAL

## 2023-10-31 PROCEDURE — 97140 MANUAL THERAPY 1/> REGIONS: CPT

## 2023-10-31 PROCEDURE — 97112 NEUROMUSCULAR REEDUCATION: CPT

## 2023-10-31 PROCEDURE — 97110 THERAPEUTIC EXERCISES: CPT

## 2023-10-31 NOTE — PROGRESS NOTES
Diagnosis:   Left shoulder pain (M25.512)        Referring Provider: No ref. provider found  Date of Evaluation:    7/31/2023     Precautions:  None Next MD visit:   none scheduled  Date of Surgery: n/a    Progress Summary  Pt has attended 10 visits in Physical Therapy. Insurance Primary/Secondary: [address] [place] [address] / N/A     # Auth Visits: no auth required            Subjective: Patient reports her shoulder is feeling better overall - will still have periods of soreness after activity but her normal exercise routine has also been impacted by travel and illness. 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 Her strength is improving.     Objective:     Initial Evaluation Reassessment 10/31/2023    Subjective *  Crossing arms to take off shirt, sitting with arms crossed, reaching across her body while unloading the , putting on deodorant on R, reaching overhead with any weight, sleeping on L side, swimming, lifting     Objective *    Shoulder AROM    Flexion: R 175; L 160*    Abduction: R 175; L 160*    ER: R 110; L 80* (90 PROM)    IR: R 75; L 60* (70 PROM)    Shoulder MMT    Flexion: R 5/5; L 4-/5*    Abduction: R 5/5; L 4-/5*    ER: R 5/5; L 4/5*    IR: R 5/5; L 4+/5*    Scapular MMT    Rhomboids: R 4+/5, L 3-/5*    Mid trap: R 4+/5; L 3-/5*    Low trap: R 4+/5; L 4/5    TTP and active TrP L supraspinatus, infraspinatus    GH AP glide hypermobility on R, hypomobility on L Shoulder AROM    Flexion: R 175; L 165*    Abduction: R 175; L 170    ER: L 95    IR: L 72    Shoulder MMT    Flexion: L 4+-/5    Abduction: L 5/5    ER: L 4+/5    IR:  L 5/5    90/90 ER: L 4+/5    90/90 IR:  L 4+/5    Scapular MMT    Rhomboids: R 4+/5, L 3-/5*    Mid trap: R 4+/5; L 3-/5*    Low trap: R 4+/5; L 4/5           Assessment: Patient has made very good progress overall in physical therapy demonstrating improvements in shoulder ROM, strength and stability, and pain behaviors. Patient's primary remaining strength and stability deficits are at end ranges of shoulder elevation and rotation, impacting her ability to perform functional combined motions when dressing and return to swimming. Continued skilled physical therapy is medically necessary to promote normal ROM, strength, and motor control to facilitate patient's return to prior level of function. Goals: (to be met in 10 visits)   Pt will report improved ability to sleep without waking due to shoulder pain   Pt will improve shoulder flexion AROM to >170 degrees to be able to reach into overhead cabinets without pain or restriction   - progressing  Pt will improve shoulder abduction AROM to >170 degrees to improve ability to don deodorant, don/doff shirts, and wash hair  - met  Pt will increase shoulder AROM ER to 90 deg to be able to reach and fasten seatbelt   - met  Pt will increase shoulder AROM IR to 80 to be able to reach in back pocket, tuck in shirt, and turn steering wheel without pain - progressing  Pt will improve shoulder strength throughout to 5/5 to improve function with lifting objects overhead  - progressing  Pt will demonstrate increased mid/low trap strength to 4+/5 to promote improved shoulder mechanics and stabilization with lifting and reaching  - progressing  Pt will be independent and compliant with comprehensive HEP to maintain progress achieved in PT         Plan: Continue skilled Physical Therapy 1 x/week or a total of 4 visits over a 90 day period. Treatment will include: shoulder strength and stability training, return to sport training       Patient/Family/Caregiver was advised of these findings, precautions, and treatment options and has agreed to actively participate in planning and for this course of care.     Thank you for your referral. If you have any questions, please contact me at Dept: [phone]. Sincerely,  Electronically signed by therapist: Liang May PT     Physician's certification required:  Yes  Please co-sign or sign and return this letter via fax as soon as possible to [phone]. I certify the need for these services furnished under this plan of treatment and while under my care.     X___________________________________________________ Date____________________    Certification From: 14/48/9333  To:1/29/2024      Date:  8/24/2023        TX#: 4/10 Date: 8/31/2023           TX#: 5/10 Date: 9/7/2023   Tx#: 6/10 Date: 9/14/2023   Tx#: 7/10 Date: 10/9/2023   Tx#: 8/10 Date: 10/24/2023   Tx#: 9/10 Date: 10/31/2023   Tx#: 10/10   Manual Therapy STM/TrP release L supraspinatus, infraspinatus GH AP glide in abduction/IR Gr III+ L GH distraction in cross-body reach Gr III GH AP glide in abduction/IR Gr III+ L GH AP glide in abduction/IR Gr III+ L  L shoulder quadrant Gr III+ GH AP glide in abduction/IR Gr III+ L L shoulder 90/90 ER PROM (with end range isometrics)    Therapeutic Exercise Manually resisted L shoulder ER at ~60 deg abduction 3 x 10  Prone low trap lift (hands behind head) x10, with T x10  Edu: HEP, return to exercise   Supine 90/90 ER/IR 2# x2 to fatigue  Side plank 15s L (trial for HEP) Supine D2 flexion with RTB x8, with no resistance x8  HEP update HEP review Single arm throwing arc with slow eccentric x15 RTB >  x15 GTB L  Handstand pushup (hands on mat table) 2 x 10 Supine 90/90 ER/IR 4# x30  Single arm throwing arc with slow eccentric 2 x 15 GTB L Reassessment for PN   Neuromuscular Re-education Rhythmic stabilization at 90 deg flexion x2 to fatigue Rhythmic stabilization at 90 deg flexion x2 to fatigue Side-lying ER plyotoss 2# 30s x 3  Body blade at 90 deg scaption 20s x 3   Prone plank with alt hip extension 2 x 10  Plyoball toss through abduction arc at wall x8 2#  Plyoball toss 90/90 ER 30s x 2 2#  Body blade at 90 deg scaption 30s x 4 Body blade, varying directions at 90 deg scaption, 90 deg flexion and overhead, 30s bouts  Rhythmic stabilization at varying abduction/ER at wall w/4# plyoball x3 to fatibue   Supine KB rotation at 90 deg flexion 2 x 15 10# L  Bottoms up KB OH press 10# x2 to fatigue L  Body blade, varying directions at 90 deg scaption, 90 deg flexion and overhead, 30s bouts Rhythmic stabilization near end range 90/90 ER at wall w/2# plyoball x3 to fatigue  Plyoball toss/catch with shoulder extension follow-through 2 x 20 2#  Body blade, varying directions at 90 deg scaption, 90 deg flexion and overhead, 30s bouts   Therapeutic Activity          HEP: shoulder B ER iso in mod plank    Charges: MT 1, TE 1, NM 1       Total Timed Treatment: NM 18 min, MT 12 min, TE 10 min  Total Treatment Time: 40 min

## 2023-11-07 ENCOUNTER — OFFICE VISIT (OUTPATIENT)
Dept: PHYSICAL THERAPY | Facility: HOSPITAL | Age: 58
End: 2023-11-07
Attending: FAMILY MEDICINE
Payer: COMMERCIAL

## 2023-11-07 PROCEDURE — 97140 MANUAL THERAPY 1/> REGIONS: CPT

## 2023-11-07 PROCEDURE — 97110 THERAPEUTIC EXERCISES: CPT

## 2023-11-07 PROCEDURE — 97112 NEUROMUSCULAR REEDUCATION: CPT

## 2023-11-07 NOTE — PROGRESS NOTES
Diagnosis:   Left shoulder pain (M25.512)        Referring Provider: No ref. provider found  Date of Evaluation:    7/31/2023     Precautions:  None Next MD visit:   none scheduled  Date of Surgery: n/a   Insurance Primary/Secondary: [address] [place] [address] / N/A     # Auth Visits: no auth required            Subjective: Patient reports overall fitness is returning to her baseline, and her shoulder has been feeling pretty good. Taking off her shirt is one motion that is still painful, but other movements like  press are feeling better. Objective:     Initial Evaluation Reassessment 10/31/2023    Subjective *  Crossing arms to take off shirt, sitting with arms crossed, reaching across her body while unloading the , putting on deodorant on R, reaching overhead with any weight, sleeping on L side, swimming, lifting     Objective *    Shoulder AROM    Flexion: R 175; L 160*    Abduction: R 175; L 160*    ER: R 110; L 80* (90 PROM)    IR: R 75; L 60* (70 PROM)    Shoulder MMT    Flexion: R 5/5; L 4-/5*    Abduction: R 5/5; L 4-/5*    ER: R 5/5; L 4/5*    IR: R 5/5; L 4+/5*    Scapular MMT    Rhomboids: R 4+/5, L 3-/5*    Mid trap: R 4+/5; L 3-/5*    Low trap: R 4+/5; L 4/5    TTP and active TrP L supraspinatus, infraspinatus    GH AP glide hypermobility on R, hypomobility on L Shoulder AROM    Flexion: R 175; L 165*    Abduction: R 175; L 170    ER: L 95    IR: L 72    Shoulder MMT    Flexion: L 4+-/5    Abduction: L 5/5    ER: L 4+/5    IR:  L 5/5    90/90 ER: L 4+/5    90/90 IR:  L 4+/5    Scapular MMT    Rhomboids: R 4+/5, L 3-/5*    Mid trap: R 4+/5; L 3-/5*    Low trap: R 4+/5; L 4/5           Assessment: Note improving motor control from  deg abduction during plyotoss and painful arc is no longer present. Incorporated RC strengthening into more compound movements to further challenge dynamic stability.         Goals: (to be met in 10 visits)   Pt will report improved ability to sleep without waking due to shoulder pain   Pt will improve shoulder flexion AROM to >170 degrees to be able to reach into overhead cabinets without pain or restriction   - progressing  Pt will improve shoulder abduction AROM to >170 degrees to improve ability to don deodorant, don/doff shirts, and wash hair  - met  Pt will increase shoulder AROM ER to 90 deg to be able to reach and fasten seatbelt   - met  Pt will increase shoulder AROM IR to 80 to be able to reach in back pocket, tuck in shirt, and turn steering wheel without pain - progressing  Pt will improve shoulder strength throughout to 5/5 to improve function with lifting objects overhead  - progressing  Pt will demonstrate increased mid/low trap strength to 4+/5 to promote improved shoulder mechanics and stabilization with lifting and reaching  - progressing  Pt will be independent and compliant with comprehensive HEP to maintain progress achieved in PT         Plan: Progress RC strength, shoulder stability, return to sport exercises.    Date: 8/31/2023           TX#: 5/10 Date: 9/7/2023   Tx#: 6/10 Date: 9/14/2023   Tx#: 7/10 Date: 10/9/2023   Tx#: 8/10 Date: 10/24/2023   Tx#: 9/10 Date: 10/31/2023   Tx#: 10/10 Date: 11/7/2023   Tx#: 11/14   Manual Therapy GH AP glide in abduction/IR Gr III+ L GH distraction in cross-body reach Gr III GH AP glide in abduction/IR Gr III+ L GH AP glide in abduction/IR Gr III+ L  L shoulder quadrant Gr III+ GH AP glide in abduction/IR Gr III+ L L shoulder 90/90 ER PROM (with end range isometrics)  L shoulder 90/90 ER PROM (with end range isometrics)   GH AP glide in abduction/IR Gr III+ L   Therapeutic Exercise Supine 90/90 ER/IR 2# x2 to fatigue  Side plank 15s L (trial for HEP) Supine D2 flexion with RTB x8, with no resistance x8  HEP update HEP review Single arm throwing arc with slow eccentric x15 RTB >  x15 GTB L  Handstand pushup (hands on mat table) 2 x 10 Supine 90/90 ER/IR 4# x30  Single arm throwing arc with slow eccentric 2 x 15 GTB L Reassessment for PN Face pull with B ER GTB 2 x 10  X band walk with B shoulder ER GTB x12 ea direction   Neuromuscular Re-education Rhythmic stabilization at 90 deg flexion x2 to fatigue Side-lying ER plyotoss 2# 30s x 3  Body blade at 90 deg scaption 20s x 3   Prone plank with alt hip extension 2 x 10  Plyoball toss through abduction arc at wall x8 2#  Plyoball toss 90/90 ER 30s x 2 2#  Body blade at 90 deg scaption 30s x 4 Body blade, varying directions at 90 deg scaption, 90 deg flexion and overhead, 30s bouts  Rhythmic stabilization at varying abduction/ER at wall w/4# plyoball x3 to fatibue   Supine KB rotation at 90 deg flexion 2 x 15 10# L  Bottoms up KB OH press 10# x2 to fatigue L  Body blade, varying directions at 90 deg scaption, 90 deg flexion and overhead, 30s bouts Rhythmic stabilization near end range 90/90 ER at wall w/2# plyoball x3 to fatigue  Plyoball toss/catch with shoulder extension follow-through 2 x 20 2#  Body blade, varying directions at 90 deg scaption, 90 deg flexion and overhead, 30s bouts Plyoball taps at end range and ~160 deg abduction 30s ea 4#  Plyoball abduction arc x5 4#   Therapeutic Activity          HEP: shoulder B ER iso in mod plank    Charges: MT 1, TE 1, NM 1       Total Timed Treatment: NM 13 min, MT 14 min, TE 13 min  Total Treatment Time: 40 min

## 2023-11-14 ENCOUNTER — OFFICE VISIT (OUTPATIENT)
Dept: PHYSICAL THERAPY | Facility: HOSPITAL | Age: 58
End: 2023-11-14
Attending: FAMILY MEDICINE
Payer: COMMERCIAL

## 2023-11-14 PROCEDURE — 97140 MANUAL THERAPY 1/> REGIONS: CPT

## 2023-11-14 PROCEDURE — 97110 THERAPEUTIC EXERCISES: CPT

## 2023-11-14 NOTE — PROGRESS NOTES
Diagnosis:   Left shoulder pain (M25.512)        Referring Provider: No ref. provider found  Date of Evaluation:    7/31/2023     Precautions:  None Next MD visit:   none scheduled  Date of Surgery: n/a   Insurance Primary/Secondary: [address] [place] [address] / N/A     # Auth Visits: no auth required            Subjective: Patient reports recent workouts at the gym have gone well with her shoulder, primarily muscle soreness. Most painful movement is still taking off a shirt. Objective:     Initial Evaluation Reassessment 10/31/2023    Subjective *  Crossing arms to take off shirt, sitting with arms crossed, reaching across her body while unloading the , putting on deodorant on R, reaching overhead with any weight, sleeping on L side, swimming, lifting     Objective *    Shoulder AROM    Flexion: R 175; L 160*    Abduction: R 175; L 160*    ER: R 110; L 80* (90 PROM)    IR: R 75; L 60* (70 PROM)    Shoulder MMT    Flexion: R 5/5; L 4-/5*    Abduction: R 5/5; L 4-/5*    ER: R 5/5; L 4/5*    IR: R 5/5; L 4+/5*    Scapular MMT    Rhomboids: R 4+/5, L 3-/5*    Mid trap: R 4+/5; L 3-/5*    Low trap: R 4+/5; L 4/5    TTP and active TrP L supraspinatus, infraspinatus    GH AP glide hypermobility on R, hypomobility on L Shoulder AROM    Flexion: R 175; L 165*    Abduction: R 175; L 170    ER: L 95    IR: L 72    Shoulder MMT    Flexion: L 4+-/5    Abduction: L 5/5    ER: L 4+/5    IR:  L 5/5    90/90 ER: L 4+/5    90/90 IR:  L 4+/5    Scapular MMT    Rhomboids: R 4+/5, L 3-/5*    Mid trap: R 4+/5; L 3-/5*    Low trap: R 4+/5; L 4/5           Assessment: Positive within-visit response to passive motion into shoulder horizontal adduction with decreased pain during cross-body reaching immediately following. Patient still experiences pain at end range ER around 90 deg abduction consistent with earlier onset of resistance than in other planes.         Goals: (to be met in 10 visits)   Pt will report improved ability to sleep without waking due to shoulder pain   Pt will improve shoulder flexion AROM to >170 degrees to be able to reach into overhead cabinets without pain or restriction   - progressing  Pt will improve shoulder abduction AROM to >170 degrees to improve ability to don deodorant, don/doff shirts, and wash hair  - met  Pt will increase shoulder AROM ER to 90 deg to be able to reach and fasten seatbelt   - met  Pt will increase shoulder AROM IR to 80 to be able to reach in back pocket, tuck in shirt, and turn steering wheel without pain - progressing  Pt will improve shoulder strength throughout to 5/5 to improve function with lifting objects overhead  - progressing  Pt will demonstrate increased mid/low trap strength to 4+/5 to promote improved shoulder mechanics and stabilization with lifting and reaching  - progressing  Pt will be independent and compliant with comprehensive HEP to maintain progress achieved in PT         Plan: Progress RC strength, shoulder stability, return to sport exercises.    Date: 9/7/2023   Tx#: 6/10 Date: 9/14/2023   Tx#: 7/10 Date: 10/9/2023   Tx#: 8/10 Date: 10/24/2023   Tx#: 9/10 Date: 10/31/2023   Tx#: 10/10 Date: 11/7/2023   Tx#: 11/14 Date: 11/14/2023   Tx#: 12/14   Manual Therapy GH distraction in cross-body reach Gr III GH AP glide in abduction/IR Gr III+ L GH AP glide in abduction/IR Gr III+ L  L shoulder quadrant Gr III+ GH AP glide in abduction/IR Gr III+ L L shoulder 90/90 ER PROM (with end range isometrics)  L shoulder 90/90 ER PROM (with end range isometrics)   GH AP glide in abduction/IR Gr III+ L L shoulder horizontal adduction PPM with scapular block Gr IV+  L shoulder quadrant Gr III+   Therapeutic Exercise Supine D2 flexion with RTB x8, with no resistance x8  HEP update HEP review Single arm throwing arc with slow eccentric x15 RTB >  x15 GTB L  Handstand pushup (hands on mat table) 2 x 10 Supine 90/90 ER/IR 4# x30  Single arm throwing arc with slow eccentric 2 x 15 GTB L Reassessment for PN Face pull with B ER GTB 2 x 10  X band walk with B shoulder ER GTB x12 ea direction Prone swimmer's stroke against RTB 2 x 10 L  Seated 90/90 ER RTB 2 x 10 L  Standing OH press in frontal plane 2 x 10 RTB   Neuromuscular Re-education Side-lying ER plyotoss 2# 30s x 3  Body blade at 90 deg scaption 20s x 3   Prone plank with alt hip extension 2 x 10  Plyoball toss through abduction arc at wall x8 2#  Plyoball toss 90/90 ER 30s x 2 2#  Body blade at 90 deg scaption 30s x 4 Body blade, varying directions at 90 deg scaption, 90 deg flexion and overhead, 30s bouts  Rhythmic stabilization at varying abduction/ER at wall w/4# plyoball x3 to fatibue   Supine KB rotation at 90 deg flexion 2 x 15 10# L  Bottoms up KB OH press 10# x2 to fatigue L  Body blade, varying directions at 90 deg scaption, 90 deg flexion and overhead, 30s bouts Rhythmic stabilization near end range 90/90 ER at wall w/2# plyoball x3 to fatigue  Plyoball toss/catch with shoulder extension follow-through 2 x 20 2#  Body blade, varying directions at 90 deg scaption, 90 deg flexion and overhead, 30s bouts Plyoball taps at end range and ~160 deg abduction 30s ea 4#  Plyoball abduction arc x5 4#    Therapeutic Activity          HEP: shoulder B ER iso in mod plank    Charges: MT 1, TE 2       Total Timed Treatment: MT 15 min, TE 24 min  Total Treatment Time: 39 min

## 2023-11-22 ENCOUNTER — OFFICE VISIT (OUTPATIENT)
Dept: PHYSICAL THERAPY | Facility: HOSPITAL | Age: 58
End: 2023-11-22
Attending: PHYSICIAN ASSISTANT
Payer: COMMERCIAL

## 2023-11-22 PROCEDURE — 97110 THERAPEUTIC EXERCISES: CPT

## 2023-11-22 PROCEDURE — 97140 MANUAL THERAPY 1/> REGIONS: CPT

## 2023-11-22 PROCEDURE — 97112 NEUROMUSCULAR REEDUCATION: CPT

## 2023-11-22 NOTE — PROGRESS NOTES
Diagnosis:   Left shoulder pain (M25.512)        Referring Provider: No ref. provider found  Date of Evaluation:    7/31/2023     Precautions:  None Next MD visit:   none scheduled  Date of Surgery: n/a   Insurance Primary/Secondary: [address] [place] [address] / N/A     # Auth Visits: no auth required            Subjective: Patient reports exercises are progressing steadily at the gym. Still gets pain through a specific ROM when crossing arms to take a shirt off. Objective:     Initial Evaluation Reassessment 10/31/2023    Subjective *  Crossing arms to take off shirt, sitting with arms crossed, reaching across her body while unloading the , putting on deodorant on R, reaching overhead with any weight, sleeping on L side, swimming, lifting     Objective *    Shoulder AROM    Flexion: R 175; L 160*    Abduction: R 175; L 160*    ER: R 110; L 80* (90 PROM)    IR: R 75; L 60* (70 PROM)    Shoulder MMT    Flexion: R 5/5; L 4-/5*    Abduction: R 5/5; L 4-/5*    ER: R 5/5; L 4/5*    IR: R 5/5; L 4+/5*    Scapular MMT    Rhomboids: R 4+/5, L 3-/5*    Mid trap: R 4+/5; L 3-/5*    Low trap: R 4+/5; L 4/5    TTP and active TrP L supraspinatus, infraspinatus    GH AP glide hypermobility on R, hypomobility on L Shoulder AROM    Flexion: R 175; L 165*    Abduction: R 175; L 170    ER: L 95    IR: L 72    Shoulder MMT    Flexion: L 4+-/5    Abduction: L 5/5    ER: L 4+/5    IR:  L 5/5    90/90 ER: L 4+/5    90/90 IR:  L 4+/5    Scapular MMT    Rhomboids: R 4+/5, L 3-/5*    Mid trap: R 4+/5; L 3-/5*    Low trap: R 4+/5; L 4/5           Assessment: Continued with strength and motor control retraining at end ranges of ER in varying abduction as pain is now minimal with movement through the frontal plane. Slight limitation in ER ROM at 90/90 in prone leading to excessive scapular tilt.         Goals: (to be met in 10 visits)   Pt will report improved ability to sleep without waking due to shoulder pain   Pt will improve shoulder flexion AROM to >170 degrees to be able to reach into overhead cabinets without pain or restriction   - progressing  Pt will improve shoulder abduction AROM to >170 degrees to improve ability to don deodorant, don/doff shirts, and wash hair  - met  Pt will increase shoulder AROM ER to 90 deg to be able to reach and fasten seatbelt   - met  Pt will increase shoulder AROM IR to 80 to be able to reach in back pocket, tuck in shirt, and turn steering wheel without pain - progressing  Pt will improve shoulder strength throughout to 5/5 to improve function with lifting objects overhead  - progressing  Pt will demonstrate increased mid/low trap strength to 4+/5 to promote improved shoulder mechanics and stabilization with lifting and reaching  - progressing  Pt will be independent and compliant with comprehensive HEP to maintain progress achieved in PT         Plan: Progress RC strength, shoulder stability, return to sport exercises.    Date: 9/14/2023   Tx#: 7/10 Date: 10/9/2023   Tx#: 8/10 Date: 10/24/2023   Tx#: 9/10 Date: 10/31/2023   Tx#: 10/10 Date: 11/7/2023   Tx#: 11/14 Date: 11/14/2023   Tx#: 12/14 Date: 11/22/2023   Tx#: 13/14   Manual Therapy GH AP glide in abduction/IR Gr III+ L GH AP glide in abduction/IR Gr III+ L  L shoulder quadrant Gr III+ GH AP glide in abduction/IR Gr III+ L L shoulder 90/90 ER PROM (with end range isometrics)  L shoulder 90/90 ER PROM (with end range isometrics)   GH AP glide in abduction/IR Gr III+ L L shoulder horizontal adduction PPM with scapular block Gr IV+  L shoulder quadrant Gr III+ L shoulder quadrant Gr III+  L GH AP glide in horizontal adduction Gr III   Therapeutic Exercise HEP review Single arm throwing arc with slow eccentric x15 RTB >  x15 GTB L  Handstand pushup (hands on mat table) 2 x 10 Supine 90/90 ER/IR 4# x30  Single arm throwing arc with slow eccentric 2 x 15 GTB L Reassessment for PN Face pull with B ER GTB 2 x 10  X band walk with B shoulder ER GTB x12 ea direction Prone swimmer's stroke against RTB 2 x 10 L  Seated 90/90 ER RTB 2 x 10 L  Standing OH press in frontal plane 2 x 10 RTB Prone row to 90/90 ER 2 x 10 L  90/90 ER ball rollups at wall 2 x 15   Neuromuscular Re-education Prone plank with alt hip extension 2 x 10  Plyoball toss through abduction arc at wall x8 2#  Plyoball toss 90/90 ER 30s x 2 2#  Body blade at 90 deg scaption 30s x 4 Body blade, varying directions at 90 deg scaption, 90 deg flexion and overhead, 30s bouts  Rhythmic stabilization at varying abduction/ER at wall w/4# plyoball x3 to fatibue   Supine KB rotation at 90 deg flexion 2 x 15 10# L  Bottoms up KB OH press 10# x2 to fatigue L  Body blade, varying directions at 90 deg scaption, 90 deg flexion and overhead, 30s bouts Rhythmic stabilization near end range 90/90 ER at wall w/2# plyoball x3 to fatigue  Plyoball toss/catch with shoulder extension follow-through 2 x 20 2#  Body blade, varying directions at 90 deg scaption, 90 deg flexion and overhead, 30s bouts Plyoball taps at end range and ~160 deg abduction 30s ea 4#  Plyoball abduction arc x5 4#  Body blade    Horiz abd/add x10    D2 flex/ext x10   Therapeutic Activity          HEP: shoulder B ER iso in mod plank    Charges: MT 1, TE 1, NM 1       Total Timed Treatment: MT 15 min, NM 10 min, TE 17 min  Total Treatment Time: 42 min

## 2023-12-01 ENCOUNTER — OFFICE VISIT (OUTPATIENT)
Dept: PHYSICAL THERAPY | Facility: HOSPITAL | Age: 58
End: 2023-12-01
Attending: FAMILY MEDICINE
Payer: COMMERCIAL

## 2023-12-01 PROCEDURE — 97110 THERAPEUTIC EXERCISES: CPT

## 2023-12-01 PROCEDURE — 97140 MANUAL THERAPY 1/> REGIONS: CPT

## 2023-12-01 NOTE — PROGRESS NOTES
Diagnosis:   Left shoulder pain (M25.512)        Referring Provider: No ref. provider found  Date of Evaluation:    7/31/2023     Precautions:  None Next MD visit:   none scheduled  Date of Surgery: n/a    Progress Summary  Pt has attended 14 visits in Physical Therapy. Insurance Primary/Secondary: [address] [place] [address] / N/A     # Auth Visits: no auth required            Subjective: Patient reports she had a few really good days at the beginning of the week, no pain in the shoulder including with exercise at the gym, taking off a shirt. She tried swimming for the first time in 6 months yesterday and it was still painful - both when coming around with her L arm out of the water and when initiating the pulling motion with the L. She does feel some soreness today.     Objective:     Initial Evaluation Reassessment 10/31/2023  Reassessment 12/1/2023    Subjective *  Crossing arms to take off shirt, sitting with arms crossed, reaching across her body while unloading the , putting on deodorant on R, reaching overhead with any weight, sleeping on L side, swimming, lifting     Objective *    Shoulder AROM    Flexion: R 175; L 160*    Abduction: R 175; L 160*    ER: R 110; L 80* (90 PROM)    IR: R 75; L 60* (70 PROM)    Shoulder MMT    Flexion: R 5/5; L 4-/5*    Abduction: R 5/5; L 4-/5*    ER: R 5/5; L 4/5*    IR: R 5/5; L 4+/5*    Scapular MMT    Rhomboids: R 4+/5, L 3-/5*    Mid trap: R 4+/5; L 3-/5*    Low trap: R 4+/5; L 4/5    TTP and active TrP L supraspinatus, infraspinatus    GH AP glide hypermobility on R, hypomobility on L Shoulder AROM    Flexion: R 175; L 165*    Abduction: R 175; L 170    ER: L 95    IR: L 72    Shoulder MMT    Flexion: L 4+/5    Abduction: L 5/5    ER: L 4+/5    IR:  L 5/5    90/90 ER: L 4+/5    90/90 IR:  L 4+/5    Scapular MMT    Rhomboids: R 4+/5, L 3-/5*    Mid trap: R 4+/5; L 3-/5*    Low trap: R 4+/5; L 4/5 Shoulder AROM    Flexion: L 172    Abduction: L 175    IR: L 75    Scapular MMT    Rhomboids: L 4/5    Mid trap: L 4+/5*    Low trap: L 4+/5           Assessment: Patient continues to make very good progress in physical therapy demonstrating improvements in shoulder ROM, shoulder and scapular strength, pain behaviors and functional ability since last reassessment. She is now at goal for all but IR ROM, and scapular mm strength is mostly normal. Patient remains limited in strength and muscular endurance when at end ranges of elevation and ER, impacting her ability to perform combined functional movements and return to her prior recreational exercise routine. Continued skilled physical therapy is medically necessary to promote normal ROM, strength, and motor control to facilitate patient's return to prior level of function. Goals: (to be met in 10 visits)   Pt will report improved ability to sleep without waking due to shoulder pain   Pt will improve shoulder flexion AROM to >170 degrees to be able to reach into overhead cabinets without pain or restriction   - met  Pt will improve shoulder abduction AROM to >170 degrees to improve ability to don deodorant, don/doff shirts, and wash hair  - met  Pt will increase shoulder AROM ER to 90 deg to be able to reach and fasten seatbelt   - met  Pt will increase shoulder AROM IR to 80 to be able to reach in back pocket, tuck in shirt, and turn steering wheel without pain - progressing  Pt will improve shoulder strength throughout to 5/5 to improve function with lifting objects overhead  - progressing  Pt will demonstrate increased mid/low trap strength to 4+/5 to promote improved shoulder mechanics and stabilization with lifting and reaching  - progressing  Pt will be independent and compliant with comprehensive HEP to maintain progress achieved in PT         Plan: Continue skilled Physical Therapy 1 x/week or a total of 4 visits over a 90 day period.  Treatment will include: RC strengthening, shoulder dynamic stabilization exercises, return to sport training       Patient/Family/Caregiver was advised of these findings, precautions, and treatment options and has agreed to actively participate in planning and for this course of care. Thank you for your referral. If you have any questions, please contact me at Dept: [phone]. Sincerely,  Electronically signed by therapist: Ashley Pan PT     Physician's certification required:  Yes  Please co-sign or sign and return this letter via fax as soon as possible to [phone]. I certify the need for these services furnished under this plan of treatment and while under my care.     X___________________________________________________ Date____________________    Certification From: 63/9/7564  To:2/29/2024      Date: 9/14/2023   Tx#: 7/10 Date: 10/9/2023   Tx#: 8/10 Date: 10/24/2023   Tx#: 9/10 Date: 10/31/2023   Tx#: 10/10 Date: 11/7/2023   Tx#: 11/14 Date: 11/14/2023   Tx#: 12/14 Date: 11/22/2023   Tx#: 13/14 Date: 12/1/2023   Tx#: 14/14   Manual Therapy GH AP glide in abduction/IR Gr III+ L GH AP glide in abduction/IR Gr III+ L  L shoulder quadrant Gr III+ GH AP glide in abduction/IR Gr III+ L L shoulder 90/90 ER PROM (with end range isometrics)  L shoulder 90/90 ER PROM (with end range isometrics)   GH AP glide in abduction/IR Gr III+ L L shoulder horizontal adduction PPM with scapular block Gr IV+  L shoulder quadrant Gr III+ L shoulder quadrant Gr III+  L GH AP glide in horizontal adduction Gr III GH AP glide in abduction/IR Gr III+ L  L shoulder PPM: 90/90 ER, flexion    Therapeutic Exercise HEP review Single arm throwing arc with slow eccentric x15 RTB >  x15 GTB L  Handstand pushup (hands on mat table) 2 x 10 Supine 90/90 ER/IR 4# x30  Single arm throwing arc with slow eccentric 2 x 15 GTB L Reassessment for PN Face pull with B ER GTB 2 x 10  X band walk with B shoulder ER GTB x12 ea direction Prone swimmer's stroke against RTB 2 x 10 L  Seated 90/90 ER RTB 2 x 10 L  Standing OH press in frontal plane 2 x 10 RTB Prone row to 90/90 ER 2 x 10 L  90/90 ER ball rollups at wall 2 x 15 Reassessment for PN  Plyoball roll-up with reach 4# 2 x 10  Plyoball trampoline toss x20 4#, x10 7#  Edu: return to activity   Neuromuscular Re-education Prone plank with alt hip extension 2 x 10  Plyoball toss through abduction arc at wall x8 2#  Plyoball toss 90/90 ER 30s x 2 2#  Body blade at 90 deg scaption 30s x 4 Body blade, varying directions at 90 deg scaption, 90 deg flexion and overhead, 30s bouts  Rhythmic stabilization at varying abduction/ER at wall w/4# plyoball x3 to fatibue   Supine KB rotation at 90 deg flexion 2 x 15 10# L  Bottoms up KB OH press 10# x2 to fatigue L  Body blade, varying directions at 90 deg scaption, 90 deg flexion and overhead, 30s bouts Rhythmic stabilization near end range 90/90 ER at wall w/2# plyoball x3 to fatigue  Plyoball toss/catch with shoulder extension follow-through 2 x 20 2#  Body blade, varying directions at 90 deg scaption, 90 deg flexion and overhead, 30s bouts Plyoball taps at end range and ~160 deg abduction 30s ea 4#  Plyoball abduction arc x5 4#  Body blade    Horiz abd/add x10    D2 flex/ext x10    Therapeutic Activity           HEP: shoulder B ER iso in mod plank    Charges: MT 1, TE 2       Total Timed Treatment: MT 16 min, TE 24 min  Total Treatment Time: 40 min

## 2023-12-08 ENCOUNTER — OFFICE VISIT (OUTPATIENT)
Dept: PHYSICAL THERAPY | Facility: HOSPITAL | Age: 58
End: 2023-12-08
Attending: FAMILY MEDICINE
Payer: COMMERCIAL

## 2023-12-08 PROCEDURE — 97140 MANUAL THERAPY 1/> REGIONS: CPT

## 2023-12-08 PROCEDURE — 97110 THERAPEUTIC EXERCISES: CPT

## 2023-12-08 NOTE — PROGRESS NOTES
Diagnosis:   Left shoulder pain (M25.512)        Referring Provider: No ref. provider found  Date of Evaluation:    7/31/2023     Precautions:  None Next MD visit:   none scheduled  Date of Surgery: n/a   Insurance Primary/Secondary: [address] [place] [address] / N/A     # Auth Visits: no auth required            Subjective: Patient reports she tried swimming again yesterday, it went better than last week - began feeling discomfort in her shoulder after two laps of freestyle, but knew it was related to fatigue. No significant soreness today, though she has been feeling pain when reaching her arm out to the side when putting on her jacket.     Objective:     Initial Evaluation Reassessment 10/31/2023  Reassessment 12/1/2023    Subjective *  Crossing arms to take off shirt, sitting with arms crossed, reaching across her body while unloading the , putting on deodorant on R, reaching overhead with any weight, sleeping on L side, swimming, lifting     Objective *    Shoulder AROM    Flexion: R 175; L 160*    Abduction: R 175; L 160*    ER: R 110; L 80* (90 PROM)    IR: R 75; L 60* (70 PROM)    Shoulder MMT    Flexion: R 5/5; L 4-/5*    Abduction: R 5/5; L 4-/5*    ER: R 5/5; L 4/5*    IR: R 5/5; L 4+/5*    Scapular MMT    Rhomboids: R 4+/5, L 3-/5*    Mid trap: R 4+/5; L 3-/5*    Low trap: R 4+/5; L 4/5    TTP and active TrP L supraspinatus, infraspinatus    GH AP glide hypermobility on R, hypomobility on L Shoulder AROM    Flexion: R 175; L 165*    Abduction: R 175; L 170    ER: L 95    IR: L 72    Shoulder MMT    Flexion: L 4+/5    Abduction: L 5/5    ER: L 4+/5    IR:  L 5/5    90/90 ER: L 4+/5    90/90 IR:  L 4+/5    Scapular MMT    Rhomboids: R 4+/5, L 3-/5*    Mid trap: R 4+/5; L 3-/5*    Low trap: R 4+/5; L 4/5 Shoulder AROM    Flexion: L 172    Abduction: L 175    IR: L 75    Scapular MMT    Rhomboids: L 4/5    Mid trap: L 4+/5*    Low trap: L 4+/5           Assessment: Compensation through thoracolumbar rotation during L UE/R LE lift in prone, lower trapezius activation improved with cueing and repetition. Plan to continue incorporating core/spine and LE motion into exercise to further challenge shoulder stability. Goals: (to be met in 10 visits)   Pt will report improved ability to sleep without waking due to shoulder pain   Pt will improve shoulder flexion AROM to >170 degrees to be able to reach into overhead cabinets without pain or restriction   - met  Pt will improve shoulder abduction AROM to >170 degrees to improve ability to don deodorant, don/doff shirts, and wash hair  - met  Pt will increase shoulder AROM ER to 90 deg to be able to reach and fasten seatbelt   - met  Pt will increase shoulder AROM IR to 80 to be able to reach in back pocket, tuck in shirt, and turn steering wheel without pain - progressing  Pt will improve shoulder strength throughout to 5/5 to improve function with lifting objects overhead  - progressing  Pt will demonstrate increased mid/low trap strength to 4+/5 to promote improved shoulder mechanics and stabilization with lifting and reaching  - progressing  Pt will be independent and compliant with comprehensive HEP to maintain progress achieved in PT         Plan: Progress sport-specific movement patterns.    Date: 10/9/2023   Tx#: 8/10 Date: 10/24/2023   Tx#: 9/10 Date: 10/31/2023   Tx#: 10/10 Date: 11/7/2023   Tx#: 11/14 Date: 11/14/2023   Tx#: 12/14 Date: 11/22/2023   Tx#: 13/14 Date: 12/1/2023   Tx#: 14/14 Date: 12/8/2023   Tx#: 15/18   Manual Therapy GH AP glide in abduction/IR Gr III+ L  L shoulder quadrant Gr III+ GH AP glide in abduction/IR Gr III+ L L shoulder 90/90 ER PROM (with end range isometrics)  L shoulder 90/90 ER PROM (with end range isometrics)   GH AP glide in abduction/IR Gr III+ L L shoulder horizontal adduction PPM with scapular block Gr IV+  L shoulder quadrant Gr III+ L shoulder quadrant Gr III+  L GH AP glide in horizontal adduction Gr III GH AP glide in abduction/IR Gr III+ L  L shoulder PPM: 90/90 ER, flexion  L shoulder horizontal adduction PPM with scapular block Gr IV+  L shoulder quadrant Gr III+   Therapeutic Exercise Single arm throwing arc with slow eccentric x15 RTB >  x15 GTB L  Handstand pushup (hands on mat table) 2 x 10 Supine 90/90 ER/IR 4# x30  Single arm throwing arc with slow eccentric 2 x 15 GTB L Reassessment for PN Face pull with B ER GTB 2 x 10  X band walk with B shoulder ER GTB x12 ea direction Prone swimmer's stroke against RTB 2 x 10 L  Seated 90/90 ER RTB 2 x 10 L  Standing OH press in frontal plane 2 x 10 RTB Prone row to 90/90 ER 2 x 10 L  90/90 ER ball rollups at wall 2 x 15 Reassessment for PN  Plyoball roll-up with reach 4# 2 x 10  Plyoball trampoline toss x20 4#, x10 7#  Edu: return to activity Prone opp UE/LE lift 2 x 8  Prone SB bilateral row to 90/90 ER 2 x 10  Plyoball roll-up with reach 4# 2 x 10  HEP update   Neuromuscular Re-education Body blade, varying directions at 90 deg scaption, 90 deg flexion and overhead, 30s bouts  Rhythmic stabilization at varying abduction/ER at wall w/4# plyoball x3 to fatibue   Supine KB rotation at 90 deg flexion 2 x 15 10# L  Bottoms up KB OH press 10# x2 to fatigue L  Body blade, varying directions at 90 deg scaption, 90 deg flexion and overhead, 30s bouts Rhythmic stabilization near end range 90/90 ER at wall w/2# plyoball x3 to fatigue  Plyoball toss/catch with shoulder extension follow-through 2 x 20 2#  Body blade, varying directions at 90 deg scaption, 90 deg flexion and overhead, 30s bouts Plyoball taps at end range and ~160 deg abduction 30s ea 4#  Plyoball abduction arc x5 4#  Body blade    Horiz abd/add x10    D2 flex/ext x10     Therapeutic Activity           HEP: shoulder B ER iso in mod plank    Charges: MT 1, TE 2       Total Timed Treatment: MT 15 min, TE 25 min  Total Treatment Time: 40 min

## 2023-12-12 DIAGNOSIS — Z12.39 ENCOUNTER FOR SCREENING FOR MALIGNANT NEOPLASM OF BREAST: Primary | ICD-10-CM

## 2023-12-15 ENCOUNTER — OFFICE VISIT (OUTPATIENT)
Dept: PHYSICAL THERAPY | Facility: HOSPITAL | Age: 58
End: 2023-12-15
Attending: FAMILY MEDICINE
Payer: COMMERCIAL

## 2023-12-15 PROCEDURE — 97110 THERAPEUTIC EXERCISES: CPT

## 2023-12-15 PROCEDURE — 97140 MANUAL THERAPY 1/> REGIONS: CPT

## 2023-12-15 NOTE — PROGRESS NOTES
Diagnosis:   Left shoulder pain (M25.512)        Referring Provider: No ref. provider found  Date of Evaluation:    7/31/2023     Precautions:  None Next MD visit:   none scheduled  Date of Surgery: n/a   Insurance Primary/Secondary: [address] [place] [address] / N/A     # Auth Visits: no auth required            Subjective: Patient reports swimming continues to go well - some pain when reaching forward fully with her L arm but no lingering soreness. Feels more fatigue on the L UE compared to the R.    Objective:     Initial Evaluation Reassessment 10/31/2023  Reassessment 12/1/2023    Subjective *  Crossing arms to take off shirt, sitting with arms crossed, reaching across her body while unloading the , putting on deodorant on R, reaching overhead with any weight, sleeping on L side, swimming, lifting     Objective *    Shoulder AROM    Flexion: R 175; L 160*    Abduction: R 175; L 160*    ER: R 110; L 80* (90 PROM)    IR: R 75; L 60* (70 PROM)    Shoulder MMT    Flexion: R 5/5; L 4-/5*    Abduction: R 5/5; L 4-/5*    ER: R 5/5; L 4/5*    IR: R 5/5; L 4+/5*    Scapular MMT    Rhomboids: R 4+/5, L 3-/5*    Mid trap: R 4+/5; L 3-/5*    Low trap: R 4+/5; L 4/5    TTP and active TrP L supraspinatus, infraspinatus    GH AP glide hypermobility on R, hypomobility on L Shoulder AROM    Flexion: R 175; L 165*    Abduction: R 175; L 170    ER: L 95    IR: L 72    Shoulder MMT    Flexion: L 4+/5    Abduction: L 5/5    ER: L 4+/5    IR:  L 5/5    90/90 ER: L 4+/5    90/90 IR:  L 4+/5    Scapular MMT    Rhomboids: R 4+/5, L 3-/5*    Mid trap: R 4+/5; L 3-/5*    Low trap: R 4+/5; L 4/5 Shoulder AROM    Flexion: L 172    Abduction: L 175    IR: L 75    Scapular MMT    Rhomboids: L 4/5    Mid trap: L 4+/5*    Low trap: L 4+/5           Assessment: Passive shoulder motion performed today to address likely muscle-related stiffness as patient has been gradually increasing her activity level out of the clinic.  Progressed to unstable surface for shoulder strengthening to challenge core and trunk stability. Goals: (to be met in 10 visits)   Pt will report improved ability to sleep without waking due to shoulder pain   Pt will improve shoulder flexion AROM to >170 degrees to be able to reach into overhead cabinets without pain or restriction   - met  Pt will improve shoulder abduction AROM to >170 degrees to improve ability to don deodorant, don/doff shirts, and wash hair  - met  Pt will increase shoulder AROM ER to 90 deg to be able to reach and fasten seatbelt   - met  Pt will increase shoulder AROM IR to 80 to be able to reach in back pocket, tuck in shirt, and turn steering wheel without pain - progressing  Pt will improve shoulder strength throughout to 5/5 to improve function with lifting objects overhead  - progressing  Pt will demonstrate increased mid/low trap strength to 4+/5 to promote improved shoulder mechanics and stabilization with lifting and reaching  - progressing  Pt will be independent and compliant with comprehensive HEP to maintain progress achieved in PT         Plan: Progress sport-specific movement patterns.    Date: 10/24/2023   Tx#: 9/10 Date: 10/31/2023   Tx#: 10/10 Date: 11/7/2023   Tx#: 11/14 Date: 11/14/2023   Tx#: 12/14 Date: 11/22/2023   Tx#: 13/14 Date: 12/1/2023   Tx#: 14/14 Date: 12/8/2023   Tx#: 15/18 Date: 12/15/2023   Tx#: 16/18   Manual Therapy GH AP glide in abduction/IR Gr III+ L L shoulder 90/90 ER PROM (with end range isometrics)  L shoulder 90/90 ER PROM (with end range isometrics)   GH AP glide in abduction/IR Gr III+ L L shoulder horizontal adduction PPM with scapular block Gr IV+  L shoulder quadrant Gr III+ L shoulder quadrant Gr III+  L GH AP glide in horizontal adduction Gr III GH AP glide in abduction/IR Gr III+ L  L shoulder PPM: 90/90 ER, flexion  L shoulder horizontal adduction PPM with scapular block Gr IV+  L shoulder quadrant Gr III+ L shoulder flexion PPM Gr III > III+ (to address through-range resistance)   Therapeutic Exercise Supine 90/90 ER/IR 4# x30  Single arm throwing arc with slow eccentric 2 x 15 GTB L Reassessment for PN Face pull with B ER GTB 2 x 10  X band walk with B shoulder ER GTB x12 ea direction Prone swimmer's stroke against RTB 2 x 10 L  Seated 90/90 ER RTB 2 x 10 L  Standing OH press in frontal plane 2 x 10 RTB Prone row to 90/90 ER 2 x 10 L  90/90 ER ball rollups at wall 2 x 15 Reassessment for PN  Plyoball roll-up with reach 4# 2 x 10  Plyoball trampoline toss x20 4#, x10 7#  Edu: return to activity Prone opp UE/LE lift 2 x 8  Prone SB bilateral row to 90/90 ER 2 x 10  Plyoball roll-up with reach 4# 2 x 10  HEP update Prone SB shoulder extension with slow eccentric GTB 2 x 10  Prone SB opp UE/LE lift 2 x 10   Neuromuscular Re-education Supine KB rotation at 90 deg flexion 2 x 15 10# L  Bottoms up KB OH press 10# x2 to fatigue L  Body blade, varying directions at 90 deg scaption, 90 deg flexion and overhead, 30s bouts Rhythmic stabilization near end range 90/90 ER at wall w/2# plyoball x3 to fatigue  Plyoball toss/catch with shoulder extension follow-through 2 x 20 2#  Body blade, varying directions at 90 deg scaption, 90 deg flexion and overhead, 30s bouts Plyoball taps at end range and ~160 deg abduction 30s ea 4#  Plyoball abduction arc x5 4#  Body blade    Horiz abd/add x10    D2 flex/ext x10      Therapeutic Activity           HEP: shoulder B ER iso in mod plank    Charges: MT 1, TE 1       Total Timed Treatment: MT 15 min, TE 20 min  Total Treatment Time: 35 min

## 2023-12-18 ENCOUNTER — APPOINTMENT (OUTPATIENT)
Dept: MAMMOGRAPHY | Age: 58
End: 2023-12-18
Attending: OBSTETRICS & GYNECOLOGY

## 2023-12-27 ENCOUNTER — OFFICE VISIT (OUTPATIENT)
Dept: PHYSICAL THERAPY | Facility: HOSPITAL | Age: 58
End: 2023-12-27
Attending: FAMILY MEDICINE
Payer: COMMERCIAL

## 2023-12-27 PROCEDURE — 97110 THERAPEUTIC EXERCISES: CPT

## 2023-12-27 NOTE — PROGRESS NOTES
Diagnosis:   Left shoulder pain (M25.512)        Referring Provider: No ref. provider found  Date of Evaluation:    7/31/2023     Precautions:  None Next MD visit:   none scheduled  Date of Surgery: n/a    Discharge Summary  Pt has attended 17 visits in Physical Therapy. Insurance Primary/Secondary: [address] [place] [address] / N/A     # Auth Visits: no auth required            Subjective: Patient reports her shoulder is not quite 100% but it is close. She is swimming 1-2 days/week pain-free though notes more rapid fatigue of her L UE. Taking off a shirt is minimally painful, and she can lie on her L side now comfortable. She feels ready to transition to independent management.     Objective:     Initial Evaluation Reassessment 10/31/2023  Reassessment 12/1/2023  Reassessment 12/27/2023    Subjective *  Crossing arms to take off shirt, sitting with arms crossed, reaching across her body while unloading the , putting on deodorant on R, reaching overhead with any weight, sleeping on L side, swimming, lifting     Objective *    Shoulder AROM    Flexion: R 175; L 160*    Abduction: R 175; L 160*    ER: R 110; L 80* (90 PROM)    IR: R 75; L 60* (70 PROM)    Shoulder MMT    Flexion: R 5/5; L 4-/5*    Abduction: R 5/5; L 4-/5*    ER: R 5/5; L 4/5*    IR: R 5/5; L 4+/5*    Scapular MMT    Rhomboids: R 4+/5, L 3-/5*    Mid trap: R 4+/5; L 3-/5*    Low trap: R 4+/5; L 4/5    TTP and active TrP L supraspinatus, infraspinatus    GH AP glide hypermobility on R, hypomobility on L Shoulder AROM    Flexion: R 175; L 165*    Abduction: R 175; L 170    ER: L 95    IR: L 72    Shoulder MMT    Flexion: L 4+/5    Abduction: L 5/5    ER: L 4+/5    IR:  L 5/5    90/90 ER: L 4+/5    90/90 IR:  L 4+/5    Scapular MMT    Rhomboids: R 4+/5, L 3-/5*    Mid trap: R 4+/5; L 3-/5*    Low trap: R 4+/5; L 4/5 Shoulder AROM    Flexion: L 172    Abduction: L 175    IR: L 75    Scapular MMT    Rhomboids: L 4/5    Mid trap: L 4+/5*    Low trap: L 4+/5 Shoulder AROM    IR: L 80    Shoulder MMT    Flexion: L 5/5    Abduction: L 5/5    ER: L 5/5    IR:  L 5/5    90/90 ER: L 5/5    90/90 IR:  L 5/5    Scapular MMT    Rhomboids: L 4+/5    Mid trap: L 4+/5    Low trap: L 4+/5           Assessment: Patient has made good progress in physical therapy, demonstrating improvements in shoulder ROM and strength, scapular strength, dynamic shoulder stability and symptom behaviors. Patient reports minimal functional limitation and has met all goals set at initial evaluation. She is independent and compliant with a home program that will allow long-term maintenance of gains made in PT. Patient is appropriate for discharge.           Goals: (to be met in 10 visits)   Pt will report improved ability to sleep without waking due to shoulder pain  - met  Pt will improve shoulder flexion AROM to >170 degrees to be able to reach into overhead cabinets without pain or restriction   - met  Pt will improve shoulder abduction AROM to >170 degrees to improve ability to don deodorant, don/doff shirts, and wash hair  - met  Pt will increase shoulder AROM ER to 90 deg to be able to reach and fasten seatbelt   - met  Pt will increase shoulder AROM IR to 80 to be able to reach in back pocket, tuck in shirt, and turn steering wheel without pain - met  Pt will improve shoulder strength throughout to 5/5 to improve function with lifting objects overhead  - met  Pt will demonstrate increased mid/low trap strength to 4+/5 to promote improved shoulder mechanics and stabilization with lifting and reaching  - met  Pt will be independent and compliant with comprehensive HEP to maintain progress achieved in PT  - met        Plan: D/C with continued compliance to HEP    Date: 10/31/2023   Tx#: 10/10 Date: 11/7/2023   Tx#: 11/14 Date: 11/14/2023   Tx#: 12/14 Date: 11/22/2023   Tx#: 13/14 Date: 12/1/2023   Tx#: 14/14 Date: 12/8/2023   Tx#: 15/18 Date: 12/15/2023   Tx#: 16/18 Date: 12/27/2023   Tx#: 17/18 Manual Therapy L shoulder 90/90 ER PROM (with end range isometrics)  L shoulder 90/90 ER PROM (with end range isometrics)   GH AP glide in abduction/IR Gr III+ L L shoulder horizontal adduction PPM with scapular block Gr IV+  L shoulder quadrant Gr III+ L shoulder quadrant Gr III+  L GH AP glide in horizontal adduction Gr III GH AP glide in abduction/IR Gr III+ L  L shoulder PPM: 90/90 ER, flexion  L shoulder horizontal adduction PPM with scapular block Gr IV+  L shoulder quadrant Gr III+ L shoulder flexion PPM Gr III > III+ (to address through-range resistance)    Therapeutic Exercise Reassessment for PN Face pull with B ER GTB 2 x 10  X band walk with B shoulder ER GTB x12 ea direction Prone swimmer's stroke against RTB 2 x 10 L  Seated 90/90 ER RTB 2 x 10 L  Standing OH press in frontal plane 2 x 10 RTB Prone row to 90/90 ER 2 x 10 L  90/90 ER ball rollups at wall 2 x 15 Reassessment for PN  Plyoball roll-up with reach 4# 2 x 10  Plyoball trampoline toss x20 4#, x10 7#  Edu: return to activity Prone opp UE/LE lift 2 x 8  Prone SB bilateral row to 90/90 ER 2 x 10  Plyoball roll-up with reach 4# 2 x 10  HEP update Prone SB shoulder extension with slow eccentric GTB 2 x 10  Prone SB opp UE/LE lift 2 x 10 Reassessment for D/C  HEP and gym routine review/progression   Neuromuscular Re-education Rhythmic stabilization near end range 90/90 ER at wall w/2# plyoball x3 to fatigue  Plyoball toss/catch with shoulder extension follow-through 2 x 20 2#  Body blade, varying directions at 90 deg scaption, 90 deg flexion and overhead, 30s bouts Plyoball taps at end range and ~160 deg abduction 30s ea 4#  Plyoball abduction arc x5 4#  Body blade    Horiz abd/add x10    D2 flex/ext x10       Therapeutic Activity           HEP: shoulder B ER iso in mod plank    Charges: TE 2       Total Timed Treatment: TE 29 min  Total Treatment Time: 29 min

## 2023-12-28 ENCOUNTER — APPOINTMENT (OUTPATIENT)
Dept: MAMMOGRAPHY | Age: 58
End: 2023-12-28
Attending: OBSTETRICS & GYNECOLOGY

## 2023-12-28 DIAGNOSIS — N63.20 LUMP OF LEFT BREAST: Primary | ICD-10-CM

## 2023-12-30 ENCOUNTER — TELEPHONE (OUTPATIENT)
Dept: FAMILY MEDICINE | Age: 58
End: 2023-12-30

## 2024-01-02 ENCOUNTER — TELEPHONE (OUTPATIENT)
Dept: FAMILY MEDICINE | Age: 59
End: 2024-01-02

## 2024-01-02 DIAGNOSIS — R92.30 DENSE BREAST TISSUE ON MAMMOGRAM, UNSPECIFIED TYPE: Primary | ICD-10-CM

## 2024-01-11 ENCOUNTER — HOSPITAL ENCOUNTER (OUTPATIENT)
Dept: ULTRASOUND IMAGING | Age: 59
Discharge: HOME | End: 2024-01-11
Attending: OBSTETRICS & GYNECOLOGY

## 2024-01-11 ENCOUNTER — HOSPITAL ENCOUNTER (OUTPATIENT)
Dept: MAMMOGRAPHY | Age: 59
Discharge: HOME | End: 2024-01-11
Attending: OBSTETRICS & GYNECOLOGY

## 2024-01-11 DIAGNOSIS — N63.20 LUMP OF LEFT BREAST: ICD-10-CM

## 2024-01-11 PROCEDURE — 77066 DX MAMMO INCL CAD BI: CPT

## 2024-01-11 PROCEDURE — 76642 ULTRASOUND BREAST LIMITED: CPT

## 2024-02-26 ENCOUNTER — HOSPITAL ENCOUNTER (OUTPATIENT)
Dept: CT IMAGING | Age: 59
Discharge: HOME | End: 2024-02-26
Attending: PHYSICIAN ASSISTANT

## 2024-02-26 DIAGNOSIS — R92.30 DENSE BREAST TISSUE ON MAMMOGRAM, UNSPECIFIED TYPE: ICD-10-CM

## 2024-02-26 PROCEDURE — 76641 ULTRASOUND BREAST COMPLETE: CPT

## 2024-02-26 PROCEDURE — 77067 SCR MAMMO BI INCL CAD: CPT

## 2024-04-01 ENCOUNTER — E-ADVICE (OUTPATIENT)
Dept: FAMILY MEDICINE | Age: 59
End: 2024-04-01

## 2024-04-12 ENCOUNTER — E-ADVICE (OUTPATIENT)
Dept: ADMINISTRATIVE | Age: 59
End: 2024-04-12

## 2024-09-17 ENCOUNTER — E-ADVICE (OUTPATIENT)
Dept: FAMILY MEDICINE | Age: 59
End: 2024-09-17

## 2024-09-19 DIAGNOSIS — N95.1 MENOPAUSAL SYMPTOMS: Primary | ICD-10-CM

## 2024-11-22 SDOH — ECONOMIC STABILITY: GENERAL: WOULD YOU LIKE HELP WITH ANY OF THE FOLLOWING NEEDS?: I DON'T WANT HELP WITH ANY OF THESE

## 2024-11-22 SDOH — ECONOMIC STABILITY: FOOD INSECURITY: WITHIN THE PAST 12 MONTHS, THE FOOD YOU BOUGHT JUST DIDN'T LAST AND YOU DIDN'T HAVE MONEY TO GET MORE.: NEVER TRUE

## 2024-11-22 SDOH — ECONOMIC STABILITY: TRANSPORTATION INSECURITY
IN THE PAST 12 MONTHS, HAS LACK OF RELIABLE TRANSPORTATION KEPT YOU FROM MEDICAL APPOINTMENTS, MEETINGS, WORK OR FROM GETTING THINGS NEEDED FOR DAILY LIVING?: NO

## 2024-11-22 SDOH — ECONOMIC STABILITY: HOUSING INSECURITY: DO YOU HAVE PROBLEMS WITH ANY OF THE FOLLOWING?: NONE OF THE ABOVE

## 2024-11-22 SDOH — ECONOMIC STABILITY: HOUSING INSECURITY: WHAT IS YOUR LIVING SITUATION TODAY?: I HAVE A STEADY PLACE TO LIVE

## 2024-11-22 ASSESSMENT — SOCIAL DETERMINANTS OF HEALTH (SDOH): IN THE PAST 12 MONTHS, HAS THE ELECTRIC, GAS, OIL, OR WATER COMPANY THREATENED TO SHUT OFF SERVICE IN YOUR HOME?: NO

## 2024-11-25 ENCOUNTER — APPOINTMENT (OUTPATIENT)
Dept: OBGYN | Age: 59
End: 2024-11-25

## 2024-11-25 VITALS
TEMPERATURE: 97.8 F | SYSTOLIC BLOOD PRESSURE: 119 MMHG | HEIGHT: 67 IN | DIASTOLIC BLOOD PRESSURE: 80 MMHG | WEIGHT: 158.18 LBS | BODY MASS INDEX: 24.83 KG/M2 | OXYGEN SATURATION: 95 % | HEART RATE: 70 BPM

## 2024-11-25 DIAGNOSIS — Z78.0 MENOPAUSE: Primary | ICD-10-CM

## 2024-11-25 DIAGNOSIS — F32.A DEPRESSION, UNSPECIFIED DEPRESSION TYPE: ICD-10-CM

## 2024-11-25 DIAGNOSIS — R68.82 LIBIDO, DECREASED: ICD-10-CM

## 2024-11-25 PROCEDURE — 99204 OFFICE O/P NEW MOD 45 MIN: CPT | Performed by: OBSTETRICS & GYNECOLOGY

## 2024-11-25 RX ORDER — BUPROPION HYDROCHLORIDE 150 MG/1
150 TABLET ORAL DAILY
Qty: 90 TABLET | Refills: 0 | Status: SHIPPED | OUTPATIENT
Start: 2024-11-25 | End: 2025-02-23

## 2024-11-25 ASSESSMENT — PATIENT HEALTH QUESTIONNAIRE - PHQ9
1. LITTLE INTEREST OR PLEASURE IN DOING THINGS: NOT AT ALL
CLINICAL INTERPRETATION OF PHQ2 SCORE: NO FURTHER SCREENING NEEDED
SUM OF ALL RESPONSES TO PHQ9 QUESTIONS 1 AND 2: 0
2. FEELING DOWN, DEPRESSED OR HOPELESS: NOT AT ALL
SUM OF ALL RESPONSES TO PHQ9 QUESTIONS 1 AND 2: 0

## 2024-11-29 DIAGNOSIS — Z12.31 ENCOUNTER FOR SCREENING MAMMOGRAM FOR MALIGNANT NEOPLASM OF BREAST: Primary | ICD-10-CM

## 2024-12-05 ENCOUNTER — TELEPHONE (OUTPATIENT)
Dept: OBGYN | Age: 59
End: 2024-12-05

## 2024-12-11 ENCOUNTER — E-ADVICE (OUTPATIENT)
Dept: OBGYN | Age: 59
End: 2024-12-11

## 2024-12-17 ENCOUNTER — APPOINTMENT (OUTPATIENT)
Dept: MAMMOGRAPHY | Age: 59
End: 2024-12-17

## 2024-12-23 ENCOUNTER — HOSPITAL ENCOUNTER (OUTPATIENT)
Dept: MAMMOGRAPHY | Age: 59
Discharge: HOME | End: 2024-12-23
Attending: OBSTETRICS & GYNECOLOGY

## 2024-12-23 DIAGNOSIS — Z12.31 ENCOUNTER FOR SCREENING MAMMOGRAM FOR MALIGNANT NEOPLASM OF BREAST: ICD-10-CM

## 2024-12-23 PROCEDURE — 77063 BREAST TOMOSYNTHESIS BI: CPT

## 2025-01-20 ENCOUNTER — APPOINTMENT (OUTPATIENT)
Dept: OBGYN | Age: 60
End: 2025-01-20

## 2025-01-20 VITALS
HEART RATE: 52 BPM | OXYGEN SATURATION: 100 % | TEMPERATURE: 98 F | HEIGHT: 66 IN | WEIGHT: 161.82 LBS | DIASTOLIC BLOOD PRESSURE: 81 MMHG | BODY MASS INDEX: 26.01 KG/M2 | SYSTOLIC BLOOD PRESSURE: 135 MMHG

## 2025-01-20 DIAGNOSIS — Z12.31 SCREENING MAMMOGRAM FOR BREAST CANCER: ICD-10-CM

## 2025-01-20 DIAGNOSIS — F32.A DEPRESSION, UNSPECIFIED DEPRESSION TYPE: ICD-10-CM

## 2025-01-20 DIAGNOSIS — Z01.419 ENCOUNTER FOR WELL WOMAN EXAM WITH ROUTINE GYNECOLOGICAL EXAM: Primary | ICD-10-CM

## 2025-01-20 PROCEDURE — 99396 PREV VISIT EST AGE 40-64: CPT | Performed by: OBSTETRICS & GYNECOLOGY

## 2025-01-20 RX ORDER — BUPROPION HYDROCHLORIDE 150 MG/1
150 TABLET ORAL DAILY
Qty: 90 TABLET | Refills: 3 | Status: SHIPPED | OUTPATIENT
Start: 2025-01-20 | End: 2026-01-20

## 2025-01-20 ASSESSMENT — PATIENT HEALTH QUESTIONNAIRE - PHQ9
1. LITTLE INTEREST OR PLEASURE IN DOING THINGS: NOT AT ALL
2. FEELING DOWN, DEPRESSED OR HOPELESS: NOT AT ALL
SUM OF ALL RESPONSES TO PHQ9 QUESTIONS 1 AND 2: 0
SUM OF ALL RESPONSES TO PHQ9 QUESTIONS 1 AND 2: 0
CLINICAL INTERPRETATION OF PHQ2 SCORE: NO FURTHER SCREENING NEEDED

## 2025-01-27 LAB
CASE RPRT: NORMAL
CYTOLOGY CVX/VAG STUDY: NORMAL
HPV16+18+45 E6+E7MRNA CVX NAA+PROBE: NEGATIVE
Lab: NORMAL
PAP EDUCATIONAL NOTE: NORMAL
SPECIMEN ADEQUACY: NORMAL

## 2025-03-31 ENCOUNTER — E-ADVICE (OUTPATIENT)
Dept: OBGYN | Age: 60
End: 2025-03-31

## 2025-03-31 DIAGNOSIS — R92.30 DENSE BREAST: Primary | ICD-10-CM

## 2025-05-05 ENCOUNTER — HOSPITAL ENCOUNTER (OUTPATIENT)
Dept: CT IMAGING | Age: 60
Discharge: HOME | End: 2025-05-05
Attending: OBSTETRICS & GYNECOLOGY

## 2025-05-05 DIAGNOSIS — Z12.31 SCREENING MAMMOGRAM FOR BREAST CANCER: ICD-10-CM

## 2025-05-05 DIAGNOSIS — R92.30 DENSE BREAST: ICD-10-CM

## 2025-05-05 PROCEDURE — 76641 ULTRASOUND BREAST COMPLETE: CPT

## 2025-05-05 PROCEDURE — 77063 BREAST TOMOSYNTHESIS BI: CPT

## 2025-06-10 ENCOUNTER — E-ADVICE (OUTPATIENT)
Dept: FAMILY MEDICINE | Age: 60
End: 2025-06-10

## 2025-06-12 ENCOUNTER — E-ADVICE (OUTPATIENT)
Dept: ADMINISTRATIVE | Age: 60
End: 2025-06-12

## 2025-06-16 ENCOUNTER — APPOINTMENT (OUTPATIENT)
Dept: FAMILY MEDICINE | Age: 60
End: 2025-06-16

## 2025-06-16 DIAGNOSIS — G43.009 MIGRAINE WITHOUT AURA AND WITHOUT STATUS MIGRAINOSUS, NOT INTRACTABLE: ICD-10-CM

## 2025-06-16 DIAGNOSIS — Z00.00 HEALTH MAINTENANCE EXAMINATION: Primary | ICD-10-CM

## 2025-06-16 PROCEDURE — 98005 SYNCH AUDIO-VIDEO EST LOW 20: CPT | Performed by: PHYSICIAN ASSISTANT

## 2025-06-16 RX ORDER — RIZATRIPTAN BENZOATE 10 MG/1
10 TABLET ORAL PRN
Qty: 9 TABLET | Refills: 1 | Status: SHIPPED | OUTPATIENT
Start: 2025-06-16

## 2025-06-16 ASSESSMENT — PATIENT HEALTH QUESTIONNAIRE - PHQ9
SUM OF ALL RESPONSES TO PHQ9 QUESTIONS 1 AND 2: 0
SUM OF ALL RESPONSES TO PHQ9 QUESTIONS 1 AND 2: 0
2. FEELING DOWN, DEPRESSED OR HOPELESS: NOT AT ALL
CLINICAL INTERPRETATION OF PHQ2 SCORE: NO FURTHER SCREENING NEEDED
1. LITTLE INTEREST OR PLEASURE IN DOING THINGS: NOT AT ALL

## 2025-07-21 ENCOUNTER — LAB SERVICES (OUTPATIENT)
Dept: LAB | Age: 60
End: 2025-07-21

## 2025-07-21 DIAGNOSIS — Z00.00 HEALTH MAINTENANCE EXAMINATION: ICD-10-CM

## 2025-07-21 LAB
ALBUMIN SERPL-MCNC: 4.4 G/DL (ref 3.4–5)
ALBUMIN/GLOB SERPL: 1.8 {RATIO} (ref 1–2.4)
ALP SERPL-CCNC: 70 UNITS/L (ref 45–117)
ALT SERPL-CCNC: 26 UNITS/L
ANION GAP SERPL CALC-SCNC: 10 MMOL/L (ref 7–19)
AST SERPL-CCNC: 26 UNITS/L
BASOPHILS # BLD: 0 K/MCL (ref 0–0.3)
BASOPHILS NFR BLD: 1 %
BILIRUB SERPL-MCNC: 0.9 MG/DL (ref 0.2–1)
BUN SERPL-MCNC: 14 MG/DL (ref 6–20)
BUN/CREAT SERPL: 16 (ref 7–25)
CALCIUM SERPL-MCNC: 9.4 MG/DL (ref 8.4–10.2)
CHLORIDE SERPL-SCNC: 101 MMOL/L (ref 97–110)
CHOLEST SERPL-MCNC: 244 MG/DL
CHOLEST/HDLC SERPL: 3.6 {RATIO}
CO2 SERPL-SCNC: 29 MMOL/L (ref 21–32)
CREAT SERPL-MCNC: 0.9 MG/DL (ref 0.51–0.95)
DEPRECATED RDW RBC: 41.4 FL (ref 39–50)
EGFRCR SERPLBLD CKD-EPI 2021: 75 ML/MIN/{1.73_M2}
EOSINOPHIL # BLD: 0.1 K/MCL (ref 0–0.5)
EOSINOPHIL NFR BLD: 1 %
ERYTHROCYTE [DISTWIDTH] IN BLOOD: 12 % (ref 11–15)
FASTING DURATION TIME PATIENT: 12 HOURS (ref 0–999)
GLOBULIN SER-MCNC: 2.4 G/DL (ref 2–4)
GLUCOSE SERPL-MCNC: 89 MG/DL (ref 70–99)
HCT VFR BLD CALC: 41.8 % (ref 36–46.5)
HDLC SERPL-MCNC: 67 MG/DL
HGB BLD-MCNC: 14.2 G/DL (ref 12–15.5)
IMM GRANULOCYTES # BLD AUTO: 0 K/MCL (ref 0–0.2)
IMM GRANULOCYTES # BLD: 0 %
LDLC SERPL CALC-MCNC: 163 MG/DL
LYMPHOCYTES # BLD: 2.2 K/MCL (ref 1–4)
LYMPHOCYTES NFR BLD: 33 %
MCH RBC QN AUTO: 31.6 PG (ref 26–34)
MCHC RBC AUTO-ENTMCNC: 34 G/DL (ref 32–36.5)
MCV RBC AUTO: 93.1 FL (ref 78–100)
MONOCYTES # BLD: 0.3 K/MCL (ref 0.3–0.9)
MONOCYTES NFR BLD: 5 %
NEUTROPHILS # BLD: 4 K/MCL (ref 1.8–7.7)
NEUTROPHILS NFR BLD: 60 %
NONHDLC SERPL-MCNC: 177 MG/DL
NRBC BLD MANUAL-RTO: 0 /100 WBC
PLATELET # BLD AUTO: 257 K/MCL (ref 140–450)
POTASSIUM SERPL-SCNC: 5 MMOL/L (ref 3.4–5.1)
PROT SERPL-MCNC: 6.8 G/DL (ref 6.4–8.2)
RBC # BLD: 4.49 MIL/MCL (ref 4–5.2)
SODIUM SERPL-SCNC: 135 MMOL/L (ref 135–145)
TRIGL SERPL-MCNC: 68 MG/DL
TSH SERPL-ACNC: 2.21 MCUNITS/ML (ref 0.35–5)
WBC # BLD: 6.6 K/MCL (ref 4.2–11)

## 2025-07-21 PROCEDURE — 80050 GENERAL HEALTH PANEL: CPT | Performed by: CLINICAL MEDICAL LABORATORY

## 2025-07-21 PROCEDURE — 80061 LIPID PANEL: CPT | Performed by: CLINICAL MEDICAL LABORATORY

## 2025-07-21 PROCEDURE — 36415 COLL VENOUS BLD VENIPUNCTURE: CPT | Performed by: PHYSICIAN ASSISTANT

## 2025-08-18 ENCOUNTER — APPOINTMENT (OUTPATIENT)
Dept: FAMILY MEDICINE | Age: 60
End: 2025-08-18

## 2025-08-18 VITALS
HEART RATE: 66 BPM | DIASTOLIC BLOOD PRESSURE: 62 MMHG | SYSTOLIC BLOOD PRESSURE: 122 MMHG | WEIGHT: 158.8 LBS | RESPIRATION RATE: 18 BRPM | HEIGHT: 67 IN | BODY MASS INDEX: 24.92 KG/M2 | TEMPERATURE: 97.9 F | OXYGEN SATURATION: 99 %

## 2025-08-18 DIAGNOSIS — Z23 NEED FOR PNEUMOCOCCAL VACCINATION: ICD-10-CM

## 2025-08-18 DIAGNOSIS — Z13.6 SCREENING FOR HEART DISEASE: ICD-10-CM

## 2025-08-18 DIAGNOSIS — E78.2 MIXED HYPERLIPIDEMIA: ICD-10-CM

## 2025-08-18 DIAGNOSIS — Z00.00 HEALTH MAINTENANCE EXAMINATION: Primary | ICD-10-CM

## 2025-08-18 PROBLEM — M77.8 SUBSCAPULARIS TENDINITIS OF LEFT SHOULDER: Status: RESOLVED | Noted: 2023-03-01 | Resolved: 2025-08-18

## 2025-08-18 PROBLEM — F32.5 MAJOR DEPRESSIVE DISORDER WITH SINGLE EPISODE, IN FULL REMISSION (CMD): Status: ACTIVE | Noted: 2025-08-18

## 2025-08-18 ASSESSMENT — ENCOUNTER SYMPTOMS
SHORTNESS OF BREATH: 0
FEVER: 0
ABDOMINAL PAIN: 0
COUGH: 0
DIZZINESS: 0

## 2025-08-18 ASSESSMENT — PATIENT HEALTH QUESTIONNAIRE - PHQ9
CLINICAL INTERPRETATION OF PHQ2 SCORE: NO FURTHER SCREENING NEEDED
1. LITTLE INTEREST OR PLEASURE IN DOING THINGS: NOT AT ALL
SUM OF ALL RESPONSES TO PHQ9 QUESTIONS 1 AND 2: 0
2. FEELING DOWN, DEPRESSED OR HOPELESS: NOT AT ALL
SUM OF ALL RESPONSES TO PHQ9 QUESTIONS 1 AND 2: 0

## 2025-08-18 ASSESSMENT — PAIN SCALES - GENERAL: PAINLEVEL_OUTOF10: 0

## (undated) NOTE — LETTER
Patient Name: Gerard Cruz  YOB: 1965          MRN :  [id number]  Date:  10/31/2023  Referring Physician:  No ref. provider found    Progress Summary  Pt has attended 10 visits in Physical Therapy. Insurance Primary/Secondary: [address] [place] [address] / N/A     # Auth Visits: no auth required            Subjective: Patient reports her shoulder is feeling better overall - will still have periods of soreness after activity but her normal exercise routine has also been impacted by travel and illness. 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 Her strength is improving.     Objective:     Initial Evaluation Reassessment 10/31/2023    Subjective *  Crossing arms to take off shirt, sitting with arms crossed, reaching across her body while unloading the , putting on deodorant on R, reaching overhead with any weight, sleeping on L side, swimming, lifting     Objective *    Shoulder AROM    Flexion: R 175; L 160*    Abduction: R 175; L 160*    ER: R 110; L 80* (90 PROM)    IR: R 75; L 60* (70 PROM)    Shoulder MMT    Flexion: R 5/5; L 4-/5*    Abduction: R 5/5; L 4-/5*    ER: R 5/5; L 4/5*    IR: R 5/5; L 4+/5*    Scapular MMT    Rhomboids: R 4+/5, L 3-/5*    Mid trap: R 4+/5; L 3-/5*    Low trap: R 4+/5; L 4/5    TTP and active TrP L supraspinatus, infraspinatus    GH AP glide hypermobility on R, hypomobility on L Shoulder AROM    Flexion: R 175; L 165*    Abduction: R 175; L 170    ER: L 95    IR: L 72    Shoulder MMT    Flexion: L 4+-/5    Abduction: L 5/5    ER: L 4+/5    IR:  L 5/5    90/90 ER: L 4+/5    90/90 IR:  L 4+/5    Scapular MMT    Rhomboids: R 4+/5, L 3-/5*    Mid trap: R 4+/5; L 3-/5*    Low trap: R 4+/5; L 4/5           Assessment: Patient has made very good progress overall in physical therapy demonstrating improvements in shoulder ROM, strength and stability, and pain behaviors. Patient's primary remaining strength and stability deficits are at end ranges of shoulder elevation and rotation, impacting her ability to perform functional combined motions when dressing and return to swimming. Continued skilled physical therapy is medically necessary to promote normal ROM, strength, and motor control to facilitate patient's return to prior level of function. Goals: (to be met in 10 visits)   Pt will report improved ability to sleep without waking due to shoulder pain   Pt will improve shoulder flexion AROM to >170 degrees to be able to reach into overhead cabinets without pain or restriction   - progressing  Pt will improve shoulder abduction AROM to >170 degrees to improve ability to don deodorant, don/doff shirts, and wash hair  - met  Pt will increase shoulder AROM ER to 90 deg to be able to reach and fasten seatbelt   - met  Pt will increase shoulder AROM IR to 80 to be able to reach in back pocket, tuck in shirt, and turn steering wheel without pain - progressing  Pt will improve shoulder strength throughout to 5/5 to improve function with lifting objects overhead  - progressing  Pt will demonstrate increased mid/low trap strength to 4+/5 to promote improved shoulder mechanics and stabilization with lifting and reaching  - progressing  Pt will be independent and compliant with comprehensive HEP to maintain progress achieved in PT         Plan: Continue skilled Physical Therapy 1 x/week or a total of 4 visits over a 90 day period. Treatment will include: shoulder strength and stability training, return to sport training       Patient/Family/Caregiver was advised of these findings, precautions, and treatment options and has agreed to actively participate in planning and for this course of care. Thank you for your referral. If you have any questions, please contact me at Dept: [phone].     Sincerely,  Electronically signed by therapist: Ashley Pan PT [de-identified] certification required:  Yes  Please co-sign or sign and return this letter via fax as soon as possible to [phone]. I certify the need for these services furnished under this plan of treatment and while under my care. X___________________________________________________ Date____________________    Certification From: 18/20/9931  To:1/29/2024 21st Century Cures Act Notice to Patient: Medical documents like this are made available to patients in the interest of transparency. However, be advised this is a medical document and it is intended as [id number] communication between your medical providers. This medical document may contain abbreviations, assessments, medical data, and results or other terms that are unfamiliar. Medical documents are intended to carry relevant information, facts as evident, and the clinical opinion of the practitioner. As such, this medical document may be written in language that appears blunt or direct. You are encouraged to contact your medical provider and/or [facility] Patient Experience if you have any questions about this medical document.

## (undated) NOTE — LETTER
Patient Name: Susie Appiah  YOB: 1965          MRN :  [id number]  Date:  12/1/2023  Referring Physician:  No ref. provider found    Progress Summary  Pt has attended 14 visits in Physical Therapy. Insurance Primary/Secondary: [address] [place] [address] / N/A     # Auth Visits: no auth required            Subjective: Patient reports she had a few really good days at the beginning of the week, no pain in the shoulder including with exercise at the gym, taking off a shirt. She tried swimming for the first time in 6 months yesterday and it was still painful - both when coming around with her L arm out of the water and when initiating the pulling motion with the L. She does feel some soreness today.     Objective:     Initial Evaluation Reassessment 10/31/2023  Reassessment 12/1/2023    Subjective *  Crossing arms to take off shirt, sitting with arms crossed, reaching across her body while unloading the , putting on deodorant on R, reaching overhead with any weight, sleeping on L side, swimming, lifting     Objective *    Shoulder AROM    Flexion: R 175; L 160*    Abduction: R 175; L 160*    ER: R 110; L 80* (90 PROM)    IR: R 75; L 60* (70 PROM)    Shoulder MMT    Flexion: R 5/5; L 4-/5*    Abduction: R 5/5; L 4-/5*    ER: R 5/5; L 4/5*    IR: R 5/5; L 4+/5*    Scapular MMT    Rhomboids: R 4+/5, L 3-/5*    Mid trap: R 4+/5; L 3-/5*    Low trap: R 4+/5; L 4/5    TTP and active TrP L supraspinatus, infraspinatus    GH AP glide hypermobility on R, hypomobility on L Shoulder AROM    Flexion: R 175; L 165*    Abduction: R 175; L 170    ER: L 95    IR: L 72    Shoulder MMT    Flexion: L 4+/5    Abduction: L 5/5    ER: L 4+/5    IR:  L 5/5    90/90 ER: L 4+/5    90/90 IR:  L 4+/5    Scapular MMT    Rhomboids: R 4+/5, L 3-/5*    Mid trap: R 4+/5; L 3-/5*    Low trap: R 4+/5; L 4/5 Shoulder AROM    Flexion: L 172    Abduction: L 175    IR: L 75    Scapular MMT    Rhomboids: L 4/5    Mid trap: L 4+/5*    Low trap: L 4+/5           Assessment: Patient continues to make very good progress in physical therapy demonstrating improvements in shoulder ROM, shoulder and scapular strength, pain behaviors and functional ability since last reassessment. She is now at goal for all but IR ROM, and scapular mm strength is mostly normal. Patient remains limited in strength and muscular endurance when at end ranges of elevation and ER, impacting her ability to perform combined functional movements and return to her prior recreational exercise routine. Continued skilled physical therapy is medically necessary to promote normal ROM, strength, and motor control to facilitate patient's return to prior level of function. Goals: (to be met in 10 visits)   Pt will report improved ability to sleep without waking due to shoulder pain   Pt will improve shoulder flexion AROM to >170 degrees to be able to reach into overhead cabinets without pain or restriction   - met  Pt will improve shoulder abduction AROM to >170 degrees to improve ability to don deodorant, don/doff shirts, and wash hair  - met  Pt will increase shoulder AROM ER to 90 deg to be able to reach and fasten seatbelt   - met  Pt will increase shoulder AROM IR to 80 to be able to reach in back pocket, tuck in shirt, and turn steering wheel without pain - progressing  Pt will improve shoulder strength throughout to 5/5 to improve function with lifting objects overhead  - progressing  Pt will demonstrate increased mid/low trap strength to 4+/5 to promote improved shoulder mechanics and stabilization with lifting and reaching  - progressing  Pt will be independent and compliant with comprehensive HEP to maintain progress achieved in PT         Plan: Continue skilled Physical Therapy 1 x/week or a total of 4 visits over a 90 day period.  Treatment will include: RC strengthening, shoulder dynamic stabilization exercises, return to sport training       Patient/Family/Caregiver was advised of these findings, precautions, and treatment options and has agreed to actively participate in planning and for this course of care. Thank you for your referral. If you have any questions, please contact me at Dept: [phone]. Sincerely,  Electronically signed by therapist: Jodie Prado PT     Physician's certification required:  Yes  Please co-sign or sign and return this letter via fax as soon as possible to [phone]. I certify the need for these services furnished under this plan of treatment and while under my care. X___________________________________________________ Date____________________    Certification From: 40/1/2048  To:2/29/2024 21st Century Cures Act Notice to Patient: Medical documents like this are made available to patients in the interest of transparency. However, be advised this is a medical document and it is intended as [id number] communication between your medical providers. This medical document may contain abbreviations, assessments, medical data, and results or other terms that are unfamiliar. Medical documents are intended to carry relevant information, facts as evident, and the clinical opinion of the practitioner. As such, this medical document may be written in language that appears blunt or direct. You are encouraged to contact your medical provider and/or [facility] Patient Experience if you have any questions about this medical document.